# Patient Record
Sex: FEMALE | Race: WHITE | NOT HISPANIC OR LATINO
[De-identification: names, ages, dates, MRNs, and addresses within clinical notes are randomized per-mention and may not be internally consistent; named-entity substitution may affect disease eponyms.]

---

## 2018-02-20 ENCOUNTER — RESULT REVIEW (OUTPATIENT)
Age: 28
End: 2018-02-20

## 2019-01-15 ENCOUNTER — RECORD ABSTRACTING (OUTPATIENT)
Age: 29
End: 2019-01-15

## 2019-01-15 DIAGNOSIS — Z78.9 OTHER SPECIFIED HEALTH STATUS: ICD-10-CM

## 2019-01-15 DIAGNOSIS — Z82.62 FAMILY HISTORY OF OSTEOPOROSIS: ICD-10-CM

## 2019-01-15 DIAGNOSIS — Z82.49 FAMILY HISTORY OF ISCHEMIC HEART DISEASE AND OTHER DISEASES OF THE CIRCULATORY SYSTEM: ICD-10-CM

## 2019-01-15 DIAGNOSIS — Z80.6 FAMILY HISTORY OF LEUKEMIA: ICD-10-CM

## 2019-01-15 PROBLEM — Z00.00 ENCOUNTER FOR PREVENTIVE HEALTH EXAMINATION: Status: ACTIVE | Noted: 2019-01-15

## 2019-01-15 LAB — CYTOLOGY CVX/VAG DOC THIN PREP: NORMAL

## 2019-03-19 ENCOUNTER — APPOINTMENT (OUTPATIENT)
Dept: OBGYN | Facility: CLINIC | Age: 29
End: 2019-03-19
Payer: COMMERCIAL

## 2019-03-19 VITALS
WEIGHT: 128 LBS | SYSTOLIC BLOOD PRESSURE: 110 MMHG | HEIGHT: 65 IN | DIASTOLIC BLOOD PRESSURE: 70 MMHG | BODY MASS INDEX: 21.33 KG/M2

## 2019-03-19 PROCEDURE — 99395 PREV VISIT EST AGE 18-39: CPT

## 2019-03-20 LAB
C TRACH RRNA SPEC QL NAA+PROBE: NOT DETECTED
HPV HIGH+LOW RISK DNA PNL CVX: NOT DETECTED
N GONORRHOEA RRNA SPEC QL NAA+PROBE: NOT DETECTED
SOURCE AMPLIFICATION: NORMAL
SOURCE TP AMPLIFICATION: NORMAL
T VAGINALIS RRNA SPEC QL NAA+PROBE: NOT DETECTED

## 2019-03-20 NOTE — DISCUSSION/SUMMARY
[Condoms] : condom use [Pregnancy Prevention] : pregnancy prevention [FreeTextEntry1] : Options regarding birth control were reviewed.  Pt to RTO annual yearly.  All the pt's questions and concerns were addressed.

## 2019-03-20 NOTE — PHYSICAL EXAM
[Awake] : awake [Alert] : alert [Soft] : soft [Oriented x3] : oriented to person, place, and time [Labia Majora] : labia major [Labia Minora] : labia minora [Normal] : clitoris [Uterine Adnexae] : were not tender and not enlarged [No Bleeding] : there was no active vaginal bleeding [Acute Distress] : no acute distress [Mass] : no breast mass [Nipple Discharge] : no nipple discharge [Axillary LAD] : no axillary lymphadenopathy [Tender] : non tender [Normal Position] : in a normal position [FreeTextEntry6] : normal

## 2019-03-25 LAB — CYTOLOGY CVX/VAG DOC THIN PREP: NORMAL

## 2020-01-14 ENCOUNTER — APPOINTMENT (OUTPATIENT)
Dept: OBGYN | Facility: CLINIC | Age: 30
End: 2020-01-14
Payer: COMMERCIAL

## 2020-01-14 VITALS
DIASTOLIC BLOOD PRESSURE: 74 MMHG | WEIGHT: 126 LBS | BODY MASS INDEX: 20.99 KG/M2 | HEIGHT: 65 IN | SYSTOLIC BLOOD PRESSURE: 100 MMHG

## 2020-01-14 PROCEDURE — 99213 OFFICE O/P EST LOW 20 MIN: CPT

## 2020-01-14 NOTE — HISTORY OF PRESENT ILLNESS
[Last Pap ___] : Last cervical pap smear was [unfilled] [Menarche Age: ____] : age at menarche was [unfilled] [Sexually Active] : is sexually active [Definite:  ___ (Date)] : the last menstrual period was [unfilled] [Contraception] : uses contraception [Monogamous] : is monogamous [Condoms] : uses condoms [No] : no [Male ___] : [unfilled] male

## 2020-01-18 NOTE — DISCUSSION/SUMMARY
[FreeTextEntry1] : Pt will schedule left breast sono to further evaluate. Differential dx reviewed.   All the pt's questions and concerns were addressed.

## 2020-01-18 NOTE — PHYSICAL EXAM
[Awake] : awake [Alert] : alert [Acute Distress] : no acute distress [Examination Of The Breasts] : a normal appearance [Normal] : normal [No Discharge] : no discharge [___cm] : a ~M [unfilled] ~Ucm inferior lateral quadrant mass was palpated [Deep] : deep [Breast Mass Right Breast ___cm] : no was mass palpable [Tender] : tender [___cm Radial Distance from the Nipple] : [unfilled] ~Ucm radially from the nipple [6:00] : in the 6:00 position [Axillary Lymph Nodes Enlarged Bilaterally] : no enlarged nodes

## 2020-02-04 ENCOUNTER — RESULT REVIEW (OUTPATIENT)
Age: 30
End: 2020-02-04

## 2020-08-25 ENCOUNTER — APPOINTMENT (OUTPATIENT)
Dept: OBGYN | Facility: CLINIC | Age: 30
End: 2020-08-25
Payer: COMMERCIAL

## 2020-08-25 VITALS
DIASTOLIC BLOOD PRESSURE: 60 MMHG | SYSTOLIC BLOOD PRESSURE: 100 MMHG | BODY MASS INDEX: 20.66 KG/M2 | WEIGHT: 124 LBS | HEIGHT: 65 IN

## 2020-08-25 DIAGNOSIS — Z86.19 PERSONAL HISTORY OF OTHER INFECTIOUS AND PARASITIC DISEASES: ICD-10-CM

## 2020-08-25 DIAGNOSIS — N64.4 MASTODYNIA: ICD-10-CM

## 2020-08-25 DIAGNOSIS — R92.8 OTHER ABNORMAL AND INCONCLUSIVE FINDINGS ON DIAGNOSTIC IMAGING OF BREAST: ICD-10-CM

## 2020-08-25 PROCEDURE — 99395 PREV VISIT EST AGE 18-39: CPT

## 2020-08-25 RX ORDER — MEDROXYPROGESTERONE ACETATE 10 MG/1
10 TABLET ORAL
Refills: 0 | Status: COMPLETED | COMMUNITY
End: 2020-08-25

## 2020-08-26 PROBLEM — R92.8 ABNORMAL FINDING ON BREAST IMAGING: Status: RESOLVED | Noted: 2020-01-21 | Resolved: 2020-08-26

## 2020-08-26 PROBLEM — N64.4 BREAST TENDERNESS: Status: RESOLVED | Noted: 2020-01-14 | Resolved: 2020-08-26

## 2020-08-26 NOTE — HISTORY OF PRESENT ILLNESS
[1 Year Ago] : 1 year ago [Good] : being in good health [Healthy Diet] : a healthy diet [Regular Exercise] : regular exercise [Last Pap ___] : Last cervical pap smear was [unfilled] [Reproductive Age] : is of reproductive age [Contraception] : uses contraception [Condoms] : uses condoms [Definite:  ___ (Date)] : the last menstrual period was [unfilled] [Menarche Age: ____] : age at menarche was [unfilled] [Sexually Active] : is sexually active [Male ___] : [unfilled] male [No] : no [Menstrual Problems] : reports normal menses [Weight Concerns] : no concerns with her weight [Pregnancy History] : denies prior pregnancies [de-identified] : left breast us- 01/20/20 birads 4- biopsy 02/04/20 fibroadenoma

## 2020-08-26 NOTE — PHYSICAL EXAM
[Alert] : alert [Awake] : awake [Examination Of The Breasts] : a normal appearance [No Discharge] : no discharge [Soft] : soft [No Masses] : no breast masses were palpable [Oriented x3] : oriented to person, place, and time [Labia Minora] : labia minora [Labia Majora] : labia major [No Bleeding] : there was no active vaginal bleeding [Normal] : clitoris [Uterine Adnexae] : were not tender and not enlarged [Pap Obtained] : a Pap smear was performed [Acute Distress] : no acute distress [Mass] : no breast mass [Tender] : no tenderness [Nipple Discharge] : no nipple discharge [Distended] : not distended [Depressed Mood] : not depressed [Flat Affect] : affect not flat

## 2020-08-26 NOTE — END OF VISIT
[FreeTextEntry3] : I, Gabriel Campuzano, acted solely as a scribe for Dr. Vela on this date 08/25/2020.\par All medical record entries made by the Scribe were at my, Dr. Vela's direction and personally dictated by me on  08/25/2020. I have reviewed the chart and agree that the record accurately reflects my personal performance of the history, physical exam, assessment and plan. I have also personally directed, reviewed, and agreed with the chart.\par \par

## 2020-08-27 LAB — HPV HIGH+LOW RISK DNA PNL CVX: NOT DETECTED

## 2020-08-29 LAB — CYTOLOGY CVX/VAG DOC THIN PREP: NORMAL

## 2021-04-12 ENCOUNTER — RESULT CHARGE (OUTPATIENT)
Age: 31
End: 2021-04-12

## 2021-04-12 ENCOUNTER — APPOINTMENT (OUTPATIENT)
Dept: OBGYN | Facility: CLINIC | Age: 31
End: 2021-04-12
Payer: COMMERCIAL

## 2021-04-12 VITALS
BODY MASS INDEX: 21.16 KG/M2 | SYSTOLIC BLOOD PRESSURE: 98 MMHG | DIASTOLIC BLOOD PRESSURE: 60 MMHG | HEIGHT: 65 IN | WEIGHT: 127 LBS | TEMPERATURE: 97.2 F

## 2021-04-12 LAB
HCG UR QL: POSITIVE
QUALITY CONTROL: YES

## 2021-04-12 PROCEDURE — 99213 OFFICE O/P EST LOW 20 MIN: CPT

## 2021-04-12 PROCEDURE — 99072 ADDL SUPL MATRL&STAF TM PHE: CPT

## 2021-04-12 PROCEDURE — 81025 URINE PREGNANCY TEST: CPT

## 2021-04-14 NOTE — HISTORY OF PRESENT ILLNESS
[Patient reported PAP Smear was normal] : Patient reported PAP Smear was normal [Y] : Patient is sexually active [N] : Patient denies prior pregnancies [Menarche Age: ____] : age at menarche was [unfilled] [Currently Active] : currently active [Men] : men [No] : No [TextBox_4] : Pt presents for Confirmation of Pregnancy. [BreastSonogramDate] : 01/20/2020 [TextBox_25] : BR4 [PapSmeardate] : 08/25/2020 [LMPDate] : 02/14/2021 [PGHxTotal] : 0 [FreeTextEntry1] : 02/14/2021

## 2021-04-14 NOTE — DISCUSSION/SUMMARY
[FreeTextEntry1] : ICON positive\par \par Reviewed healthy eating and exercise during pregnancy.\par I advised pt to avoid deli meats, raw seafood and unpasteurized dairy.\par \par Call parameters reviewed\par \par ADvised pt to start PNV with 800 mcg folic acid\par \par Advised pt to follow up with cardiology.\par \par Pt to RTO in 1 week for NEW OB\par \par All of pt's questions and concerns were reviewed.

## 2021-04-19 ENCOUNTER — NON-APPOINTMENT (OUTPATIENT)
Age: 31
End: 2021-04-19

## 2021-04-19 ENCOUNTER — APPOINTMENT (OUTPATIENT)
Dept: OBGYN | Facility: CLINIC | Age: 31
End: 2021-04-19
Payer: COMMERCIAL

## 2021-04-19 ENCOUNTER — ASOB RESULT (OUTPATIENT)
Age: 31
End: 2021-04-19

## 2021-04-19 VITALS
HEIGHT: 65 IN | SYSTOLIC BLOOD PRESSURE: 112 MMHG | DIASTOLIC BLOOD PRESSURE: 64 MMHG | WEIGHT: 126 LBS | BODY MASS INDEX: 20.99 KG/M2

## 2021-04-19 DIAGNOSIS — Z30.09 ENCOUNTER FOR OTHER GENERAL COUNSELING AND ADVICE ON CONTRACEPTION: ICD-10-CM

## 2021-04-19 DIAGNOSIS — Z12.39 ENCOUNTER FOR OTHER SCREENING FOR MALIGNANT NEOPLASM OF BREAST: ICD-10-CM

## 2021-04-19 DIAGNOSIS — Z87.42 PERSONAL HISTORY OF OTHER DISEASES OF THE FEMALE GENITAL TRACT: ICD-10-CM

## 2021-04-19 PROCEDURE — 0500F INITIAL PRENATAL CARE VISIT: CPT

## 2021-04-19 PROCEDURE — 76817 TRANSVAGINAL US OBSTETRIC: CPT

## 2021-04-19 PROCEDURE — 99072 ADDL SUPL MATRL&STAF TM PHE: CPT

## 2021-04-19 PROCEDURE — 36415 COLL VENOUS BLD VENIPUNCTURE: CPT

## 2021-04-21 ENCOUNTER — NON-APPOINTMENT (OUTPATIENT)
Age: 31
End: 2021-04-21

## 2021-04-21 LAB
25(OH)D3 SERPL-MCNC: 46.5 NG/ML
ABO + RH PNL BLD: NORMAL
BASOPHILS # BLD AUTO: 0.03 K/UL
BASOPHILS NFR BLD AUTO: 0.3 %
BILIRUB UR QL STRIP: NORMAL
BLD GP AB SCN SERPL QL: NORMAL
C TRACH RRNA SPEC QL NAA+PROBE: NOT DETECTED
CMV IGG SERPL QL: <0.2 U/ML
CMV IGG SERPL-IMP: NEGATIVE
CMV IGM SERPL QL: <8 AU/ML
CMV IGM SERPL QL: NEGATIVE
COLLECTION METHOD: NORMAL
COVID-19 NUCLEOCAPSID  GAM ANTIBODY INTERPRETATION: NEGATIVE
EOSINOPHIL # BLD AUTO: 0.03 K/UL
EOSINOPHIL NFR BLD AUTO: 0.3 %
GLUCOSE UR-MCNC: NORMAL
HBV SURFACE AG SER QL: NONREACTIVE
HCG UR QL: 0.2 EU/DL
HCT VFR BLD CALC: 45.4 %
HGB A MFR BLD: 96.8 %
HGB A2 MFR BLD: 2.7 %
HGB BLD-MCNC: 13.9 G/DL
HGB F MFR BLD: 0.5 %
HGB FRACT BLD-IMP: NORMAL
HGB UR QL STRIP.AUTO: NORMAL
HIV1+2 AB SPEC QL IA.RAPID: NONREACTIVE
IMM GRANULOCYTES NFR BLD AUTO: 0.2 %
KETONES UR-MCNC: NORMAL
LEUKOCYTE ESTERASE UR QL STRIP: ABNORMAL
LYMPHOCYTES # BLD AUTO: 1.46 K/UL
LYMPHOCYTES NFR BLD AUTO: 17 %
M TB IFN-G BLD-IMP: NEGATIVE
MAN DIFF?: NORMAL
MCHC RBC-ENTMCNC: 30.1 PG
MCHC RBC-ENTMCNC: 30.6 GM/DL
MCV RBC AUTO: 98.3 FL
MEV IGG FLD QL IA: 54.3 AU/ML
MEV IGG+IGM SER-IMP: POSITIVE
MONOCYTES # BLD AUTO: 0.5 K/UL
MONOCYTES NFR BLD AUTO: 5.8 %
N GONORRHOEA RRNA SPEC QL NAA+PROBE: NOT DETECTED
NEUTROPHILS # BLD AUTO: 6.54 K/UL
NEUTROPHILS NFR BLD AUTO: 76.4 %
NITRITE UR QL STRIP: NORMAL
PH UR STRIP: 7
PLATELET # BLD AUTO: 338 K/UL
PROT UR STRIP-MCNC: NORMAL
QUANTIFERON TB PLUS MITOGEN MINUS NIL: 8.52 IU/ML
QUANTIFERON TB PLUS NIL: 0.01 IU/ML
QUANTIFERON TB PLUS TB1 MINUS NIL: 0 IU/ML
QUANTIFERON TB PLUS TB2 MINUS NIL: 0 IU/ML
RBC # BLD: 4.62 M/UL
RBC # FLD: 13.4 %
RUBV IGG FLD-ACNC: 2.1 INDEX
RUBV IGG SER-IMP: POSITIVE
SARS-COV-2 AB SERPL QL IA: 0.07 INDEX
SOURCE AMPLIFICATION: NORMAL
SP GR UR STRIP: 1.02
T GONDII AB SER-IMP: NEGATIVE
T GONDII IGM SER QL: <3 AU/ML
T PALLIDUM AB SER QL IA: NEGATIVE
TSH SERPL-ACNC: 1.37 UIU/ML
WBC # FLD AUTO: 8.58 K/UL

## 2021-04-25 ENCOUNTER — TRANSCRIPTION ENCOUNTER (OUTPATIENT)
Age: 31
End: 2021-04-25

## 2021-05-02 ENCOUNTER — TRANSCRIPTION ENCOUNTER (OUTPATIENT)
Age: 31
End: 2021-05-02

## 2021-05-02 ENCOUNTER — NON-APPOINTMENT (OUTPATIENT)
Age: 31
End: 2021-05-02

## 2021-05-02 LAB
AR GENE MUT ANL BLD/T: NORMAL
B19V IGG SER QL IA: 7.61 INDEX
B19V IGG+IGM SER-IMP: NORMAL
B19V IGG+IGM SER-IMP: POSITIVE
B19V IGM FLD-ACNC: 0.17 INDEX
B19V IGM SER-ACNC: NEGATIVE
CFTR MUT TESTED BLD/T: NEGATIVE
FMR1 GENE MUT ANL BLD/T: NORMAL

## 2021-05-03 ENCOUNTER — NON-APPOINTMENT (OUTPATIENT)
Age: 31
End: 2021-05-03

## 2021-05-04 ENCOUNTER — NON-APPOINTMENT (OUTPATIENT)
Age: 31
End: 2021-05-04

## 2021-05-04 ENCOUNTER — APPOINTMENT (OUTPATIENT)
Dept: OBGYN | Facility: CLINIC | Age: 31
End: 2021-05-04
Payer: COMMERCIAL

## 2021-05-04 VITALS
HEIGHT: 65 IN | DIASTOLIC BLOOD PRESSURE: 60 MMHG | BODY MASS INDEX: 21.33 KG/M2 | WEIGHT: 128 LBS | TEMPERATURE: 97.7 F | SYSTOLIC BLOOD PRESSURE: 108 MMHG

## 2021-05-04 LAB
BILIRUB UR QL STRIP: NORMAL
COLLECTION METHOD: NORMAL
GLUCOSE UR-MCNC: NORMAL
HCG UR QL: 0.2 EU/DL
HGB UR QL STRIP.AUTO: NORMAL
KETONES UR-MCNC: NORMAL
LEUKOCYTE ESTERASE UR QL STRIP: NORMAL
NITRITE UR QL STRIP: NORMAL
PH UR STRIP: 6
PROT UR STRIP-MCNC: NORMAL
SP GR UR STRIP: 1.02

## 2021-05-04 PROCEDURE — 0502F SUBSEQUENT PRENATAL CARE: CPT

## 2021-05-10 ENCOUNTER — ASOB RESULT (OUTPATIENT)
Age: 31
End: 2021-05-10

## 2021-05-10 ENCOUNTER — APPOINTMENT (OUTPATIENT)
Dept: OBGYN | Facility: CLINIC | Age: 31
End: 2021-05-10
Payer: COMMERCIAL

## 2021-05-10 PROCEDURE — 76813 OB US NUCHAL MEAS 1 GEST: CPT

## 2021-05-10 PROCEDURE — 99072 ADDL SUPL MATRL&STAF TM PHE: CPT

## 2021-05-13 ENCOUNTER — NON-APPOINTMENT (OUTPATIENT)
Age: 31
End: 2021-05-13

## 2021-05-17 ENCOUNTER — NON-APPOINTMENT (OUTPATIENT)
Age: 31
End: 2021-05-17

## 2021-05-17 ENCOUNTER — APPOINTMENT (OUTPATIENT)
Dept: OBGYN | Facility: CLINIC | Age: 31
End: 2021-05-17
Payer: COMMERCIAL

## 2021-05-17 ENCOUNTER — ASOB RESULT (OUTPATIENT)
Age: 31
End: 2021-05-17

## 2021-05-17 VITALS
BODY MASS INDEX: 21.66 KG/M2 | WEIGHT: 130 LBS | SYSTOLIC BLOOD PRESSURE: 100 MMHG | DIASTOLIC BLOOD PRESSURE: 60 MMHG | HEIGHT: 65 IN

## 2021-05-17 LAB
BILIRUB UR QL STRIP: NORMAL
GLUCOSE UR-MCNC: NORMAL
HCG UR QL: 0.2 EU/DL
HGB UR QL STRIP.AUTO: NORMAL
KETONES UR-MCNC: NORMAL
LEUKOCYTE ESTERASE UR QL STRIP: NORMAL
NITRITE UR QL STRIP: NORMAL
PH UR STRIP: 7
PROT UR STRIP-MCNC: NORMAL
SP GR UR STRIP: 1.02

## 2021-05-17 PROCEDURE — 99213 OFFICE O/P EST LOW 20 MIN: CPT

## 2021-05-17 PROCEDURE — 76817 TRANSVAGINAL US OBSTETRIC: CPT | Mod: 59

## 2021-05-17 PROCEDURE — 76815 OB US LIMITED FETUS(S): CPT

## 2021-05-17 PROCEDURE — 99072 ADDL SUPL MATRL&STAF TM PHE: CPT

## 2021-05-17 PROCEDURE — 81003 URINALYSIS AUTO W/O SCOPE: CPT | Mod: QW

## 2021-05-19 ENCOUNTER — NON-APPOINTMENT (OUTPATIENT)
Age: 31
End: 2021-05-19

## 2021-05-20 ENCOUNTER — NON-APPOINTMENT (OUTPATIENT)
Age: 31
End: 2021-05-20

## 2021-05-20 ENCOUNTER — APPOINTMENT (OUTPATIENT)
Dept: OBGYN | Facility: CLINIC | Age: 31
End: 2021-05-20
Payer: COMMERCIAL

## 2021-05-20 VITALS
WEIGHT: 129 LBS | TEMPERATURE: 97.8 F | DIASTOLIC BLOOD PRESSURE: 62 MMHG | HEIGHT: 65 IN | BODY MASS INDEX: 21.49 KG/M2 | SYSTOLIC BLOOD PRESSURE: 102 MMHG

## 2021-05-20 PROCEDURE — 0502F SUBSEQUENT PRENATAL CARE: CPT

## 2021-05-21 ENCOUNTER — NON-APPOINTMENT (OUTPATIENT)
Age: 31
End: 2021-05-21

## 2021-05-21 LAB
BILIRUB UR QL STRIP: NORMAL
COLLECTION METHOD: NORMAL
GLUCOSE UR-MCNC: NORMAL
HCG UR QL: 0.2 EU/DL
HGB UR QL STRIP.AUTO: ABNORMAL
KETONES UR-MCNC: NORMAL
LEUKOCYTE ESTERASE UR QL STRIP: NORMAL
NITRITE UR QL STRIP: NORMAL
PH UR STRIP: 6
PROT UR STRIP-MCNC: NORMAL
SP GR UR STRIP: 1.02

## 2021-05-23 LAB
BACTERIA UR CULT: ABNORMAL
CANDIDA VAG CYTO: NOT DETECTED
G VAGINALIS+PREV SP MTYP VAG QL MICRO: NOT DETECTED
T VAGINALIS VAG QL WET PREP: NOT DETECTED

## 2021-05-25 ENCOUNTER — NON-APPOINTMENT (OUTPATIENT)
Age: 31
End: 2021-05-25

## 2021-05-26 ENCOUNTER — NON-APPOINTMENT (OUTPATIENT)
Age: 31
End: 2021-05-26

## 2021-05-26 ENCOUNTER — APPOINTMENT (OUTPATIENT)
Dept: OBGYN | Facility: CLINIC | Age: 31
End: 2021-05-26
Payer: COMMERCIAL

## 2021-05-26 VITALS
SYSTOLIC BLOOD PRESSURE: 116 MMHG | DIASTOLIC BLOOD PRESSURE: 58 MMHG | BODY MASS INDEX: 21.66 KG/M2 | HEIGHT: 65 IN | WEIGHT: 130 LBS

## 2021-05-26 LAB
BILIRUB UR QL STRIP: NORMAL
COLLECTION METHOD: NORMAL
GLUCOSE UR-MCNC: NORMAL
HCG UR QL: 0.2 EU/DL
HGB UR QL STRIP.AUTO: ABNORMAL
KETONES UR-MCNC: NORMAL
LEUKOCYTE ESTERASE UR QL STRIP: NORMAL
NITRITE UR QL STRIP: NORMAL
PH UR STRIP: 5.5
PROT UR STRIP-MCNC: NORMAL
SP GR UR STRIP: 1.02

## 2021-05-26 PROCEDURE — 0502F SUBSEQUENT PRENATAL CARE: CPT

## 2021-05-30 ENCOUNTER — NON-APPOINTMENT (OUTPATIENT)
Age: 31
End: 2021-05-30

## 2021-05-30 LAB
1ST TRIMESTER DATA: NORMAL
ADDENDUM DOC: NORMAL
AFP PNL SERPL: NORMAL
AFP SERPL-ACNC: NORMAL
CLARIM 15Q11.2: NORMAL
CLARIM 1P36: NORMAL
CLARIM 22Q11.2: NORMAL
CLARIM 4P-/WOLF-HIRSCHHORN: NORMAL
CLARIM 5P-/CRI DU CHAT: NORMAL
CLARIM ADDITIONAL INFO: NORMAL
CLARIM CHROMOSOME 13: NORMAL
CLARIM CHROMOSOME 18: NORMAL
CLARIM CHROMOSOME 21: NORMAL
CLARIM SEX CHROMOSOMES: NORMAL
CLARITEST NIPT W/MICRO: NORMAL
CLINICAL BIOCHEMIST REVIEW: NORMAL
FREE BETA HCG 1ST TRIMESTER: NORMAL
Lab: NORMAL
NASAL BONE: PRESENT
NOTES NTD: NORMAL
NT: NORMAL
PAPP-A SERPL-ACNC: NORMAL
TRISOMY 18/3: NORMAL

## 2021-06-02 ENCOUNTER — NON-APPOINTMENT (OUTPATIENT)
Age: 31
End: 2021-06-02

## 2021-06-02 ENCOUNTER — APPOINTMENT (OUTPATIENT)
Dept: OBGYN | Facility: CLINIC | Age: 31
End: 2021-06-02
Payer: COMMERCIAL

## 2021-06-02 VITALS
HEIGHT: 65 IN | WEIGHT: 132 LBS | DIASTOLIC BLOOD PRESSURE: 60 MMHG | BODY MASS INDEX: 21.99 KG/M2 | SYSTOLIC BLOOD PRESSURE: 112 MMHG

## 2021-06-02 PROCEDURE — 0502F SUBSEQUENT PRENATAL CARE: CPT

## 2021-06-03 LAB
BILIRUB UR QL STRIP: NORMAL
COLLECTION METHOD: NORMAL
GLUCOSE UR-MCNC: NORMAL
HCG UR QL: 0.2 EU/DL
HGB UR QL STRIP.AUTO: NORMAL
KETONES UR-MCNC: NORMAL
LEUKOCYTE ESTERASE UR QL STRIP: NORMAL
NITRITE UR QL STRIP: NORMAL
PH UR STRIP: 7
PROT UR STRIP-MCNC: NORMAL
SP GR UR STRIP: 1.02

## 2021-06-04 ENCOUNTER — NON-APPOINTMENT (OUTPATIENT)
Age: 31
End: 2021-06-04

## 2021-06-08 LAB
1ST TRIMESTER DATA: NORMAL
2ND TRIMESTER DATA: NORMAL
AFP PNL SERPL: NORMAL
AFP SERPL-ACNC: NORMAL
AFP SERPL-ACNC: NORMAL
B-HCG FREE SERPL-MCNC: NORMAL
CLINICAL BIOCHEMIST REVIEW: NORMAL
FREE BETA HCG 1ST TRIMESTER: NORMAL
INHIBIN A SERPL-MCNC: NORMAL
NASAL BONE: PRESENT
NOTES NTD: NORMAL
NT: NORMAL
PAPP-A SERPL-ACNC: NORMAL
U ESTRIOL SERPL-SCNC: NORMAL

## 2021-07-06 ENCOUNTER — NON-APPOINTMENT (OUTPATIENT)
Age: 31
End: 2021-07-06

## 2021-07-07 ENCOUNTER — APPOINTMENT (OUTPATIENT)
Dept: ANTEPARTUM | Facility: CLINIC | Age: 31
End: 2021-07-07
Payer: COMMERCIAL

## 2021-07-07 ENCOUNTER — ASOB RESULT (OUTPATIENT)
Age: 31
End: 2021-07-07

## 2021-07-07 ENCOUNTER — APPOINTMENT (OUTPATIENT)
Dept: MATERNAL FETAL MEDICINE | Facility: CLINIC | Age: 31
End: 2021-07-07
Payer: COMMERCIAL

## 2021-07-07 ENCOUNTER — NON-APPOINTMENT (OUTPATIENT)
Age: 31
End: 2021-07-07

## 2021-07-07 VITALS
BODY MASS INDEX: 22.66 KG/M2 | SYSTOLIC BLOOD PRESSURE: 96 MMHG | WEIGHT: 136 LBS | RESPIRATION RATE: 16 BRPM | HEART RATE: 72 BPM | OXYGEN SATURATION: 98 % | DIASTOLIC BLOOD PRESSURE: 54 MMHG | HEIGHT: 65 IN

## 2021-07-07 DIAGNOSIS — Z78.9 OTHER SPECIFIED HEALTH STATUS: ICD-10-CM

## 2021-07-07 DIAGNOSIS — Z11.3 ENCOUNTER FOR SCREENING FOR INFECTIONS WITH A PREDOMINANTLY SEXUAL MODE OF TRANSMISSION: ICD-10-CM

## 2021-07-07 DIAGNOSIS — Z98.890 OTHER SPECIFIED POSTPROCEDURAL STATES: ICD-10-CM

## 2021-07-07 PROCEDURE — 99072 ADDL SUPL MATRL&STAF TM PHE: CPT

## 2021-07-07 PROCEDURE — 76817 TRANSVAGINAL US OBSTETRIC: CPT

## 2021-07-07 PROCEDURE — 99244 OFF/OP CNSLTJ NEW/EST MOD 40: CPT

## 2021-07-07 PROCEDURE — 76811 OB US DETAILED SNGL FETUS: CPT

## 2021-07-07 RX ORDER — SODIUM SULFACETAMIDE 100 MG/ML
10 LOTION TOPICAL
Qty: 118 | Refills: 0 | Status: DISCONTINUED | COMMUNITY
Start: 2021-01-29 | End: 2021-07-07

## 2021-07-07 NOTE — SURGICAL HISTORY
[Last Pap: ___] : Last Pap: [unfilled] [Last Mammo: ___] : Last Mammo: [unfilled] [Fibroids] : no fibroids [Abn Paps] : no abnormal pap smears [Breast Disease] : no breast disease [STI's] : no STI's [Infertility] : no infertility [Cysts] : no cysts [OC Use] : no OC use

## 2021-07-07 NOTE — DISCUSSION/SUMMARY
[FreeTextEntry1] : She is 20 weeks and 3 days gestation by her last menstrual period dates. \par \par She was born with a ventricular septal defect. She told me that she was last seen by a cardiologist, who was affiliated with [facility], approximately 3 years ago. She has been told that the ventricular septal defect is very small and she states that she is asymptomatic.  I told her that her that her offspring are at increased risk for having congenital heart defects because she was born with the ventricular septal defect.  The relative risk for the fetus having a congenital heart defect is 3.5.  I gave her a referral to have a fetal echocardiogram done at approximately 22 weeks of gestation.\par \par I discussed today's ultrasound finding of fetal echogenic intracardiac focus. I told her that an echogenic intracardiac focus is not considered a structural or functional cardiac abnormality. It has not been associated with congenital heart malformations in newborns. She was told that a fetal echocardiogram is not recommended. I made her aware of the association between a fetal echogenic intracardiac focus and fetal aneuploidy such as Trisomy 21.  I informed her that fetal echogenic intracardiac foci are a common finding during the second trimester of pregnancy and are usually seen in 3 to 5% of normal fetuses.  The prevalence of the echogenic intracardiac focus varies according to maternal ethnicity, being higher in fetuses of women of [place], -American and Middle-Eastern descent. It is believed that the echogenic focus represents mineralization of the papillary muscle.  She was informed that ultrasound markers suggestive of fetal aneuploidy like the echogenic intracardiac foci are non-specific, most frequently seen in normal fetuses, and often transient. A meta-analysis reported a 2.9 likelihood ratio for Trisomy 21 when an isolated echogenic intracardiac focus was defined as no major fetal abnormalities, no ventriculomegaly, no increased nuchal skin fold thickness, no echogenic bowel, no pyelectasis, and no short femur or humerus.  I told her that there were no major fetal structural malformations or other ultrasound markers suggestive of fetal aneuploidy seen during today's ultrasound examination. She was told that all pregnancies have a 2 to 3% risk of having a baby born with a birth defect. I discussed the various screening and diagnostic tests used for prenatal diagnosis.  I explained the difference between screening and diagnostic tests. I reviewed her available prenatal records for a First Trimester Screen test, Sequential Screen test, and a noninvasive prenatal screen test.  All screening tests results reported a low risk for fetal chromosomal malformations. She was made aware that prenatal diagnosis is available to determine whether the fetus she is carrying has normal or abnormal chromosomes.  She was offered diagnostic testing with a genetic amniocentesis.  She was made aware of the small risk of miscarriage associated with the diagnostic procedure, the limitations of the procedure, and the alternative of not having the procedure. She decided to not have the genetic amniocentesis.  I told her that an echogenic intracardiac focus is considered a normal variant whenever there is a negative aneuploidy screen for Down syndrome, and no visualized fetal structural malformations or other aneuploidy markers. She expressed understanding and all her questions were answered.\par \par I recommend a glucola challenge test to screen for gestational diabetes between 24 and 28 weeks gestation. I also recommend the Tdap vaccine between 27 and 36 weeks of gestation to prevent pertussis infection in the  infant. I recommend the flu vaccine during flu season (October through May). She should have serial ultrasounds to evaluate fetal growth and development.

## 2021-07-07 NOTE — OB HISTORY
[LMP: ___] : LMP: [unfilled] [MONTANA: ___] : MONTANA: [unfilled] [EGA: ___ wks] : EGA: [unfilled] wks [Spontaneous] : Spontaneous conception [Sonogram] : sonogram [at ___ wks] : at [unfilled] weeks [Definite:  ___ (Date)] : the last menstrual period was [unfilled] [Normal Amount/Duration] : was of a normal amount and duration [Regular Cycle Intervals] : periods have been regular [Frequency: Q ___ days] : menstrual periods occur approximately every [unfilled] days [Menarche Age: ____] : age at menarche was [unfilled] [FreeTextEntry1] : \par She had a first trimester screen test done on May 10, 2021 that reported a low risk for Down syndrome and trisomy 18/13.  She had a sequential screen test done on June 2, 2021 that reported a low risk for fetal chromosomal malformations.  She also had a noninvasive prenatal screen test on June 2, 2021 that reported a low risk for fetal chromosomal malformations. [Spotting Between  Menses] : no spotting between menses [Menstrual Cramps] : no menstrual cramps [On BCP at conception] : the patient was not on BCP at conception

## 2021-07-07 NOTE — FAMILY HISTORY
[Age 35+ During Pregnancy] : not 35 or over during pregnancy [Reported Family History Of Birth Defects] : no congenital heart defects [Brett-Sachs Carrier] : no Brett-Sachs [Family History] : no mental retardation/autism [Reported Family History Of Genetic Disease] : no history of child defect in child of baby father

## 2021-07-07 NOTE — CHIEF COMPLAINT
[G ___] : G [unfilled] [P ___] : P [unfilled] [de-identified] : because she was born with a ventricular septal defect

## 2021-07-07 NOTE — VITALS
[LMP (date): ___] : LMP was on [unfilled] [GA =___ Weeks] : which calculates to a GA of [unfilled] weeks [GA= ___ Days] : and [unfilled] day(s) [MONTANA by LMP (date): ___] : The calculated MONTANA by LMP is [unfilled] [By LMP] : this is the final MONTANA

## 2021-07-13 ENCOUNTER — NON-APPOINTMENT (OUTPATIENT)
Age: 31
End: 2021-07-13

## 2021-07-15 ENCOUNTER — NON-APPOINTMENT (OUTPATIENT)
Age: 31
End: 2021-07-15

## 2021-07-15 ENCOUNTER — APPOINTMENT (OUTPATIENT)
Dept: OBGYN | Facility: CLINIC | Age: 31
End: 2021-07-15
Payer: COMMERCIAL

## 2021-07-15 VITALS
WEIGHT: 138 LBS | HEIGHT: 65 IN | BODY MASS INDEX: 22.99 KG/M2 | DIASTOLIC BLOOD PRESSURE: 70 MMHG | SYSTOLIC BLOOD PRESSURE: 112 MMHG | TEMPERATURE: 97.9 F

## 2021-07-15 LAB
BILIRUB UR QL STRIP: NEGATIVE
GLUCOSE UR-MCNC: NEGATIVE
HCG UR QL: 0.2 EU/DL
HGB UR QL STRIP.AUTO: ABNORMAL
KETONES UR-MCNC: NEGATIVE
LEUKOCYTE ESTERASE UR QL STRIP: NEGATIVE
NITRITE UR QL STRIP: NEGATIVE
PH UR STRIP: 7.5
PROT UR STRIP-MCNC: NEGATIVE
SP GR UR STRIP: 1.02

## 2021-07-15 PROCEDURE — 0502F SUBSEQUENT PRENATAL CARE: CPT

## 2021-07-22 ENCOUNTER — APPOINTMENT (OUTPATIENT)
Dept: PEDIATRIC CARDIOLOGY | Facility: CLINIC | Age: 31
End: 2021-07-22
Payer: COMMERCIAL

## 2021-07-22 DIAGNOSIS — Q21.0 VENTRICULAR SEPTAL DEFECT: ICD-10-CM

## 2021-07-22 PROCEDURE — 76827 ECHO EXAM OF FETAL HEART: CPT

## 2021-07-22 PROCEDURE — 99204 OFFICE O/P NEW MOD 45 MIN: CPT | Mod: 25

## 2021-07-22 PROCEDURE — 76821 MIDDLE CEREBRAL ARTERY ECHO: CPT

## 2021-07-22 PROCEDURE — 93325 DOPPLER ECHO COLOR FLOW MAPG: CPT | Mod: 59

## 2021-07-22 PROCEDURE — 76825 ECHO EXAM OF FETAL HEART: CPT

## 2021-07-22 PROCEDURE — 76820 UMBILICAL ARTERY ECHO: CPT

## 2021-07-22 PROCEDURE — 99072 ADDL SUPL MATRL&STAF TM PHE: CPT

## 2021-08-09 ENCOUNTER — NON-APPOINTMENT (OUTPATIENT)
Age: 31
End: 2021-08-09

## 2021-08-16 ENCOUNTER — APPOINTMENT (OUTPATIENT)
Dept: OBGYN | Facility: CLINIC | Age: 31
End: 2021-08-16
Payer: COMMERCIAL

## 2021-08-16 ENCOUNTER — LABORATORY RESULT (OUTPATIENT)
Age: 31
End: 2021-08-16

## 2021-08-16 VITALS
HEIGHT: 65 IN | SYSTOLIC BLOOD PRESSURE: 118 MMHG | WEIGHT: 142 LBS | BODY MASS INDEX: 23.66 KG/M2 | DIASTOLIC BLOOD PRESSURE: 78 MMHG

## 2021-08-16 PROCEDURE — 0502F SUBSEQUENT PRENATAL CARE: CPT

## 2021-08-16 PROCEDURE — 36415 COLL VENOUS BLD VENIPUNCTURE: CPT

## 2021-08-24 ENCOUNTER — NON-APPOINTMENT (OUTPATIENT)
Age: 31
End: 2021-08-24

## 2021-08-24 LAB — GLUCOSE 1H P 50 G GLC PO SERPL-MCNC: 157 MG/DL

## 2021-08-26 ENCOUNTER — NON-APPOINTMENT (OUTPATIENT)
Age: 31
End: 2021-08-26

## 2021-08-28 LAB
GLUCOSE 1H P 100 G GLC PO SERPL-MCNC: 138 MG/DL
GLUCOSE 2H P CHAL SERPL-MCNC: 121 MG/DL
GLUCOSE 3H P CHAL SERPL-MCNC: 108 MG/DL
GLUCOSE BS SERPL-MCNC: 71 MG/DL

## 2021-09-01 ENCOUNTER — NON-APPOINTMENT (OUTPATIENT)
Age: 31
End: 2021-09-01

## 2021-09-10 ENCOUNTER — NON-APPOINTMENT (OUTPATIENT)
Age: 31
End: 2021-09-10

## 2021-09-10 ENCOUNTER — APPOINTMENT (OUTPATIENT)
Dept: CARDIOLOGY | Facility: CLINIC | Age: 31
End: 2021-09-10
Payer: COMMERCIAL

## 2021-09-10 VITALS
SYSTOLIC BLOOD PRESSURE: 92 MMHG | BODY MASS INDEX: 24.16 KG/M2 | HEIGHT: 65 IN | OXYGEN SATURATION: 96 % | WEIGHT: 145 LBS | HEART RATE: 97 BPM | DIASTOLIC BLOOD PRESSURE: 60 MMHG

## 2021-09-10 PROCEDURE — 93000 ELECTROCARDIOGRAM COMPLETE: CPT

## 2021-09-10 PROCEDURE — 99215 OFFICE O/P EST HI 40 MIN: CPT

## 2021-09-10 PROCEDURE — 99205 OFFICE O/P NEW HI 60 MIN: CPT

## 2021-09-10 NOTE — FAMILY HISTORY
[FreeTextEntry1] : FamilyHistory_20_twCiteListControlStart FamilyHistory_20_twCiteListControlEnd [id number] \par A four-generation family history was constructed and scanned into [employer]. \par Family history is significant for: \par siblings\par 30 yo twin sister fraternal nml ECHO- healthy - 7W old daughter\par \par Mother 62 yo MVP otherwise healthy \par Maternal aunts  none\par Maternal uncles dec 10 yo car accident\par Maternal Grandmother 84 yo lymphoma in remission \par Maternal Grandfather 88 yo lymphoma in remission, HLD\par \par Father 62 yo hx MVP, HCM\par Paternal aunts x2\par 57 yo aunt healthy- 2 sons 23 yo and 20 yo healthy\par 61 yo aunt healthy- son 30 yo, daughter 34 yo (daughter 1 yo and pregnant w/ F) healthy\par Paternal uncles none\par Paternal Grandfather dec 55 yo lung CA \par Paternal Grandmother dec 78 yo dementia\par \par children:\par pregnant 30 weeks echogenic focus on papillary muscle, fetal echo\par \par her maternal families originate from [place] and [place] and paternal families originate from [place]/ [place]\par + Mandaen unk if Ashkenazi Baptist ancestry. \par Family history was negative for consanguinity  \par No family history of SIDS\par  \par \par  [FreeTextEntry2] : [place] and [place]ian [FreeTextEntry3] : Vanes[place]/ [place]

## 2021-09-10 NOTE — PHYSICAL EXAM
[Normal] : no rash, no stigmata of neurocutaneous disease [Muscle tone/ strength] : muscle tone/ strength is normal [Tremor] : no tremor

## 2021-09-10 NOTE — REASON FOR VISIT
[FreeTextEntry3] : MEGAN DAVIS  is being seen  for an initial consultation at the Cardiogenomics Program at [facility] on 09/10/2021.   Ms. DAVIS was referred by Dr Yepez for hereditary cardiac predisposition risk assessment and counseling, due to her Fh of HCM and hx of VSD\par \par

## 2021-09-10 NOTE — HISTORY OF PRESENT ILLNESS
[FreeTextEntry1] : MEGAN DAVIS is a 29 yo F PMH VSD, currently pregnant  LMP 2/14/21 30 weeks \par \par used to be followed annually by a pediatric cardiologist growing up , was a competative swimmer\par denies hx of CM \par \par Her family history detailed below is significant for CM in her father\par prior fluttering in heart no arrhythmia on workup due to caffeine\par now resolved\par \par today she is referred for a cardiogenomic evaluation

## 2021-09-14 ENCOUNTER — APPOINTMENT (OUTPATIENT)
Dept: OBGYN | Facility: CLINIC | Age: 31
End: 2021-09-14
Payer: COMMERCIAL

## 2021-09-14 VITALS
BODY MASS INDEX: 23.99 KG/M2 | WEIGHT: 144 LBS | HEIGHT: 65 IN | SYSTOLIC BLOOD PRESSURE: 116 MMHG | DIASTOLIC BLOOD PRESSURE: 62 MMHG

## 2021-09-14 DIAGNOSIS — Z34.92 ENCOUNTER FOR SUPERVISION OF NORMAL PREGNANCY, UNSPECIFIED, SECOND TRIMESTER: ICD-10-CM

## 2021-09-14 PROCEDURE — 0502F SUBSEQUENT PRENATAL CARE: CPT

## 2021-09-15 LAB
BILIRUB UR QL STRIP: NORMAL
CLARITY UR: CLEAR
COLLECTION METHOD: NORMAL
GLUCOSE UR-MCNC: NORMAL
HCG UR QL: 0.2 EU/DL
HGB UR QL STRIP.AUTO: NORMAL
KETONES UR-MCNC: NORMAL
LEUKOCYTE ESTERASE UR QL STRIP: NORMAL
NITRITE UR QL STRIP: NORMAL
PH UR STRIP: 6
PROT UR STRIP-MCNC: NORMAL
SP GR UR STRIP: 1.01

## 2021-09-17 PROBLEM — Z34.92 SECOND TRIMESTER PREGNANCY: Status: RESOLVED | Noted: 2021-05-26 | Resolved: 2021-09-17

## 2021-09-29 ENCOUNTER — ASOB RESULT (OUTPATIENT)
Age: 31
End: 2021-09-29

## 2021-09-29 ENCOUNTER — APPOINTMENT (OUTPATIENT)
Dept: ANTEPARTUM | Facility: CLINIC | Age: 31
End: 2021-09-29
Payer: COMMERCIAL

## 2021-09-29 PROCEDURE — 76816 OB US FOLLOW-UP PER FETUS: CPT

## 2021-09-29 PROCEDURE — 76819 FETAL BIOPHYS PROFIL W/O NST: CPT

## 2021-09-30 ENCOUNTER — NON-APPOINTMENT (OUTPATIENT)
Age: 31
End: 2021-09-30

## 2021-09-30 ENCOUNTER — MED ADMIN CHARGE (OUTPATIENT)
Age: 31
End: 2021-09-30

## 2021-09-30 ENCOUNTER — APPOINTMENT (OUTPATIENT)
Dept: OBGYN | Facility: CLINIC | Age: 31
End: 2021-09-30
Payer: COMMERCIAL

## 2021-09-30 VITALS
SYSTOLIC BLOOD PRESSURE: 102 MMHG | WEIGHT: 147 LBS | DIASTOLIC BLOOD PRESSURE: 60 MMHG | HEIGHT: 65 IN | BODY MASS INDEX: 24.49 KG/M2

## 2021-09-30 LAB
BILIRUB UR QL STRIP: NORMAL
GLUCOSE UR-MCNC: NORMAL
HCG UR QL: 0.2 EU/DL
HGB UR QL STRIP.AUTO: NORMAL
KETONES UR-MCNC: NORMAL
LEUKOCYTE ESTERASE UR QL STRIP: ABNORMAL
NITRITE UR QL STRIP: NORMAL
PH UR STRIP: 7
PROT UR STRIP-MCNC: NORMAL
SP GR UR STRIP: 1.01

## 2021-09-30 PROCEDURE — 0502F SUBSEQUENT PRENATAL CARE: CPT

## 2021-09-30 PROCEDURE — 90471 IMMUNIZATION ADMIN: CPT

## 2021-09-30 PROCEDURE — 90715 TDAP VACCINE 7 YRS/> IM: CPT

## 2021-10-12 ENCOUNTER — APPOINTMENT (OUTPATIENT)
Dept: OBGYN | Facility: CLINIC | Age: 31
End: 2021-10-12
Payer: COMMERCIAL

## 2021-10-12 VITALS
SYSTOLIC BLOOD PRESSURE: 108 MMHG | DIASTOLIC BLOOD PRESSURE: 60 MMHG | WEIGHT: 149 LBS | BODY MASS INDEX: 24.83 KG/M2 | HEIGHT: 65 IN

## 2021-10-12 LAB
BILIRUB UR QL STRIP: NORMAL
GLUCOSE UR-MCNC: NORMAL
HCG UR QL: 0.2 EU/DL
HGB UR QL STRIP.AUTO: NORMAL
KETONES UR-MCNC: NORMAL
LEUKOCYTE ESTERASE UR QL STRIP: NORMAL
NITRITE UR QL STRIP: NORMAL
PH UR STRIP: 7
PROT UR STRIP-MCNC: NORMAL
SP GR UR STRIP: 1.01

## 2021-10-12 PROCEDURE — 0502F SUBSEQUENT PRENATAL CARE: CPT

## 2021-10-20 LAB — COMBINED CARDIAC PANEL: POSITIVE

## 2021-10-27 ENCOUNTER — APPOINTMENT (OUTPATIENT)
Dept: ANTEPARTUM | Facility: CLINIC | Age: 31
End: 2021-10-27
Payer: COMMERCIAL

## 2021-10-27 ENCOUNTER — ASOB RESULT (OUTPATIENT)
Age: 31
End: 2021-10-27

## 2021-10-27 PROCEDURE — 76816 OB US FOLLOW-UP PER FETUS: CPT

## 2021-10-27 PROCEDURE — 76819 FETAL BIOPHYS PROFIL W/O NST: CPT

## 2021-10-28 ENCOUNTER — APPOINTMENT (OUTPATIENT)
Dept: OBGYN | Facility: CLINIC | Age: 31
End: 2021-10-28
Payer: COMMERCIAL

## 2021-10-28 VITALS
DIASTOLIC BLOOD PRESSURE: 60 MMHG | WEIGHT: 150 LBS | BODY MASS INDEX: 24.99 KG/M2 | HEIGHT: 65 IN | SYSTOLIC BLOOD PRESSURE: 108 MMHG

## 2021-10-28 DIAGNOSIS — R82.71 BACTERIURIA: ICD-10-CM

## 2021-10-28 DIAGNOSIS — Z3A.20 20 WEEKS GESTATION OF PREGNANCY: ICD-10-CM

## 2021-10-28 DIAGNOSIS — Z3A.28 28 WEEKS GESTATION OF PREGNANCY: ICD-10-CM

## 2021-10-28 PROCEDURE — 36415 COLL VENOUS BLD VENIPUNCTURE: CPT

## 2021-10-28 PROCEDURE — 0502F SUBSEQUENT PRENATAL CARE: CPT

## 2021-10-30 LAB
BILIRUB UR QL STRIP: NORMAL
COLLECTION METHOD: NORMAL
GLUCOSE UR-MCNC: NORMAL
HCG UR QL: 0.2 EU/DL
HGB UR QL STRIP.AUTO: NORMAL
HIV1+2 AB SPEC QL IA.RAPID: NONREACTIVE
KETONES UR-MCNC: NORMAL
LEUKOCYTE ESTERASE UR QL STRIP: NORMAL
NITRITE UR QL STRIP: NORMAL
PH UR STRIP: 6.5
PROT UR STRIP-MCNC: NORMAL
SP GR UR STRIP: 1.02
T PALLIDUM AB SER QL IA: NEGATIVE

## 2021-11-02 ENCOUNTER — APPOINTMENT (OUTPATIENT)
Dept: OBGYN | Facility: CLINIC | Age: 31
End: 2021-11-02
Payer: COMMERCIAL

## 2021-11-02 VITALS
HEIGHT: 62 IN | WEIGHT: 152 LBS | BODY MASS INDEX: 27.97 KG/M2 | SYSTOLIC BLOOD PRESSURE: 108 MMHG | DIASTOLIC BLOOD PRESSURE: 60 MMHG

## 2021-11-02 PROCEDURE — 0502F SUBSEQUENT PRENATAL CARE: CPT

## 2021-11-05 ENCOUNTER — NON-APPOINTMENT (OUTPATIENT)
Age: 31
End: 2021-11-05

## 2021-11-05 ENCOUNTER — APPOINTMENT (OUTPATIENT)
Dept: CARDIOLOGY | Facility: CLINIC | Age: 31
End: 2021-11-05
Payer: COMMERCIAL

## 2021-11-05 PROCEDURE — 99243 OFF/OP CNSLTJ NEW/EST LOW 30: CPT

## 2021-11-05 NOTE — FAMILY HISTORY
[FreeTextEntry1] : FamilyHistory_20_twCiteListControlStart FamilyHistory_20_twCiteListControlEnd [id number] \par A four-generation family history was constructed and scanned into [employer]. \par Family history is significant for: \par siblings\par 28 yo twin sister fraternal nml ECHO- healthy - 7W old daughter\par \par Mother 64 yo MVP otherwise healthy \par Maternal aunts  none\par Maternal uncles dec 10 yo car accident\par Maternal Grandmother 84 yo lymphoma in remission \par Maternal Grandfather 90 yo lymphoma in remission, HLD\par \par Father 64 yo hx MVP, HCM\par Paternal aunts x2\par 57 yo aunt healthy- 2 sons 21 yo and 20 yo healthy\par 59 yo aunt healthy- son 28 yo, daughter 32 yo (daughter 1 yo and pregnant w/ F) healthy\par Paternal uncles none\par Paternal Grandfather dec 53 yo lung CA \par Paternal Grandmother dec 80 yo dementia\par \par children:\par pregnant 30 weeks echogenic focus on papillary muscle, fetal echo\par \par her maternal families originate from [place] and [place] and paternal families originate from [place]/ [place]\par + Yazdanism unk if Ashkenazi Hoahaoism ancestry. \par Family history was negative for consanguinity  \par No family history of SIDS\par  \par \par  [FreeTextEntry2] : [place] and Korean [FreeTextEntry3] : Vanesa/ Cook Islander

## 2021-11-05 NOTE — HISTORY OF PRESENT ILLNESS
[Home] : at home, [unfilled] , at the time of the visit. [Medical Office: ([facility])___] : at the medical office located in  [Verbal consent obtained from patient] : the patient, [unfilled] [FreeTextEntry1] : MEGAN DAVIS is a 31 yo F PMH VSD, currently pregnant  LMP 2/14/21 30 weeks \par \par used to be followed annually by a pediatric cardiologist growing up , was a competitive swimmer\par denies hx of CM \par \par Her family history detailed below is significant for CM in her father\par prior fluttering in heart no arrhythmia on workup due to caffeine\par now resolved\par \par she underwent genetic testing and today presents for results

## 2021-11-05 NOTE — REASON FOR VISIT
[FreeTextEntry3] : MEGAN DAVIS  is being seen  for an initial consultation at the Cardiogenomics Program at [facility] on 09/10/2021.   Ms. DAVIS was referred by Dr Yepez for hereditary cardiac predisposition risk assessment and counseling, due to her Fh of HCM and hx of VSD\par \par

## 2021-11-08 LAB
BILIRUB UR QL STRIP: NORMAL
GLUCOSE UR-MCNC: NORMAL
HCG UR QL: 0.2 EU/DL
HGB UR QL STRIP.AUTO: NORMAL
KETONES UR-MCNC: NORMAL
LEUKOCYTE ESTERASE UR QL STRIP: ABNORMAL
NITRITE UR QL STRIP: NORMAL
PH UR STRIP: 5.5
PROT UR STRIP-MCNC: NORMAL
SP GR UR STRIP: 1.01

## 2021-11-09 ENCOUNTER — APPOINTMENT (OUTPATIENT)
Dept: OBGYN | Facility: CLINIC | Age: 31
End: 2021-11-09
Payer: COMMERCIAL

## 2021-11-09 VITALS
HEIGHT: 62 IN | DIASTOLIC BLOOD PRESSURE: 60 MMHG | BODY MASS INDEX: 27.6 KG/M2 | SYSTOLIC BLOOD PRESSURE: 102 MMHG | WEIGHT: 150 LBS

## 2021-11-09 LAB
BILIRUB UR QL STRIP: NORMAL
GLUCOSE UR-MCNC: NORMAL
HCG UR QL: 7 EU/DL
HGB UR QL STRIP.AUTO: NORMAL
KETONES UR-MCNC: ABNORMAL
LEUKOCYTE ESTERASE UR QL STRIP: NORMAL
NITRITE UR QL STRIP: NORMAL
PH UR STRIP: 7
PROT UR STRIP-MCNC: NORMAL
SP GR UR STRIP: 1.02

## 2021-11-09 PROCEDURE — 0502F SUBSEQUENT PRENATAL CARE: CPT

## 2021-11-12 ENCOUNTER — INPATIENT (INPATIENT)
Facility: HOSPITAL | Age: 31
LOS: 2 days | Discharge: HOME | End: 2021-11-15
Attending: SPECIALIST | Admitting: SPECIALIST
Payer: COMMERCIAL

## 2021-11-12 VITALS
WEIGHT: 149.91 LBS | HEART RATE: 95 BPM | HEIGHT: 65 IN | TEMPERATURE: 98 F | RESPIRATION RATE: 18 BRPM | SYSTOLIC BLOOD PRESSURE: 105 MMHG | DIASTOLIC BLOOD PRESSURE: 62 MMHG

## 2021-11-12 DIAGNOSIS — O26.893 OTHER SPECIFIED PREGNANCY RELATED CONDITIONS, THIRD TRIMESTER: ICD-10-CM

## 2021-11-12 DIAGNOSIS — O47.1 FALSE LABOR AT OR AFTER 37 COMPLETED WEEKS OF GESTATION: ICD-10-CM

## 2021-11-12 RX ORDER — OXYTOCIN 10 UNIT/ML
333.33 VIAL (ML) INJECTION
Qty: 20 | Refills: 0 | Status: COMPLETED | OUTPATIENT
Start: 2021-11-13 | End: 2021-11-13

## 2021-11-12 RX ORDER — AMPICILLIN TRIHYDRATE 250 MG
1 CAPSULE ORAL EVERY 4 HOURS
Refills: 0 | Status: DISCONTINUED | OUTPATIENT
Start: 2021-11-13 | End: 2021-11-13

## 2021-11-12 RX ORDER — SODIUM CHLORIDE 9 MG/ML
1000 INJECTION, SOLUTION INTRAVENOUS
Refills: 0 | Status: DISCONTINUED | OUTPATIENT
Start: 2021-11-13 | End: 2021-11-13

## 2021-11-12 RX ORDER — CITRIC ACID/SODIUM CITRATE 300-500 MG
30 SOLUTION, ORAL ORAL ONCE
Refills: 0 | Status: COMPLETED | OUTPATIENT
Start: 2021-11-13 | End: 2021-11-13

## 2021-11-13 ENCOUNTER — RESULT REVIEW (OUTPATIENT)
Age: 31
End: 2021-11-13

## 2021-11-13 LAB
ABO RH CONFIRMATION: SIGNIFICANT CHANGE UP
BASOPHILS # BLD AUTO: 0.04 K/UL — SIGNIFICANT CHANGE UP (ref 0–0.2)
BASOPHILS NFR BLD AUTO: 0.6 % — SIGNIFICANT CHANGE UP (ref 0–2)
BLD GP AB SCN SERPL QL: SIGNIFICANT CHANGE UP
COVID-19 SPIKE DOMAIN AB INTERP: NEGATIVE — SIGNIFICANT CHANGE UP
COVID-19 SPIKE DOMAIN ANTIBODY RESULT: 0.4 U/ML — SIGNIFICANT CHANGE UP
EOSINOPHIL # BLD AUTO: 0.04 K/UL — SIGNIFICANT CHANGE UP (ref 0–0.5)
EOSINOPHIL NFR BLD AUTO: 0.6 % — SIGNIFICANT CHANGE UP (ref 0–6)
HCT VFR BLD CALC: 37.8 % — SIGNIFICANT CHANGE UP (ref 34.5–45)
HGB BLD-MCNC: 12.7 G/DL — SIGNIFICANT CHANGE UP (ref 11.5–15.5)
IMM GRANULOCYTES NFR BLD AUTO: 0.4 % — SIGNIFICANT CHANGE UP (ref 0–1.5)
LYMPHOCYTES # BLD AUTO: 1.72 K/UL — SIGNIFICANT CHANGE UP (ref 1–3.3)
LYMPHOCYTES # BLD AUTO: 24.2 % — SIGNIFICANT CHANGE UP (ref 13–44)
MCHC RBC-ENTMCNC: 30.7 PG — SIGNIFICANT CHANGE UP (ref 27–34)
MCHC RBC-ENTMCNC: 33.6 GM/DL — SIGNIFICANT CHANGE UP (ref 32–36)
MCV RBC AUTO: 91.3 FL — SIGNIFICANT CHANGE UP (ref 80–100)
MONOCYTES # BLD AUTO: 0.56 K/UL — SIGNIFICANT CHANGE UP (ref 0–0.9)
MONOCYTES NFR BLD AUTO: 7.9 % — SIGNIFICANT CHANGE UP (ref 2–14)
NEUTROPHILS # BLD AUTO: 4.71 K/UL — SIGNIFICANT CHANGE UP (ref 1.8–7.4)
NEUTROPHILS NFR BLD AUTO: 66.3 % — SIGNIFICANT CHANGE UP (ref 43–77)
PLATELET # BLD AUTO: 203 K/UL — SIGNIFICANT CHANGE UP (ref 150–400)
RBC # BLD: 4.14 M/UL — SIGNIFICANT CHANGE UP (ref 3.8–5.2)
RBC # FLD: 12.6 % — SIGNIFICANT CHANGE UP (ref 10.3–14.5)
SARS-COV-2 IGG+IGM SERPL QL IA: 0.4 U/ML — SIGNIFICANT CHANGE UP
SARS-COV-2 IGG+IGM SERPL QL IA: NEGATIVE — SIGNIFICANT CHANGE UP
SARS-COV-2 RNA SPEC QL NAA+PROBE: SIGNIFICANT CHANGE UP
T PALLIDUM AB TITR SER: NEGATIVE — SIGNIFICANT CHANGE UP
WBC # BLD: 7.1 K/UL — SIGNIFICANT CHANGE UP (ref 3.8–10.5)
WBC # FLD AUTO: 7.1 K/UL — SIGNIFICANT CHANGE UP (ref 3.8–10.5)

## 2021-11-13 PROCEDURE — 59400 OBSTETRICAL CARE: CPT

## 2021-11-13 PROCEDURE — 88307 TISSUE EXAM BY PATHOLOGIST: CPT | Mod: 26

## 2021-11-13 RX ORDER — OXYCODONE HYDROCHLORIDE 5 MG/1
5 TABLET ORAL
Refills: 0 | Status: DISCONTINUED | OUTPATIENT
Start: 2021-11-13 | End: 2021-11-15

## 2021-11-13 RX ORDER — MAGNESIUM HYDROXIDE 400 MG/1
30 TABLET, CHEWABLE ORAL
Refills: 0 | Status: DISCONTINUED | OUTPATIENT
Start: 2021-11-13 | End: 2021-11-15

## 2021-11-13 RX ORDER — ACETAMINOPHEN 500 MG
975 TABLET ORAL
Refills: 0 | Status: DISCONTINUED | OUTPATIENT
Start: 2021-11-13 | End: 2021-11-15

## 2021-11-13 RX ORDER — OXYCODONE HYDROCHLORIDE 5 MG/1
5 TABLET ORAL ONCE
Refills: 0 | Status: DISCONTINUED | OUTPATIENT
Start: 2021-11-13 | End: 2021-11-15

## 2021-11-13 RX ORDER — HYDROCORTISONE 1 %
1 OINTMENT (GRAM) TOPICAL EVERY 6 HOURS
Refills: 0 | Status: DISCONTINUED | OUTPATIENT
Start: 2021-11-13 | End: 2021-11-15

## 2021-11-13 RX ORDER — DIPHENHYDRAMINE HCL 50 MG
25 CAPSULE ORAL EVERY 6 HOURS
Refills: 0 | Status: DISCONTINUED | OUTPATIENT
Start: 2021-11-13 | End: 2021-11-15

## 2021-11-13 RX ORDER — BENZOCAINE 10 %
1 GEL (GRAM) MUCOUS MEMBRANE EVERY 6 HOURS
Refills: 0 | Status: DISCONTINUED | OUTPATIENT
Start: 2021-11-13 | End: 2021-11-15

## 2021-11-13 RX ORDER — SIMETHICONE 80 MG/1
80 TABLET, CHEWABLE ORAL EVERY 4 HOURS
Refills: 0 | Status: DISCONTINUED | OUTPATIENT
Start: 2021-11-13 | End: 2021-11-15

## 2021-11-13 RX ORDER — OXYTOCIN 10 UNIT/ML
333.33 VIAL (ML) INJECTION
Qty: 20 | Refills: 0 | Status: DISCONTINUED | OUTPATIENT
Start: 2021-11-13 | End: 2021-11-15

## 2021-11-13 RX ORDER — SODIUM CHLORIDE 9 MG/ML
3 INJECTION INTRAMUSCULAR; INTRAVENOUS; SUBCUTANEOUS EVERY 8 HOURS
Refills: 0 | Status: DISCONTINUED | OUTPATIENT
Start: 2021-11-13 | End: 2021-11-15

## 2021-11-13 RX ORDER — AMPICILLIN TRIHYDRATE 250 MG
2 CAPSULE ORAL ONCE
Refills: 0 | Status: COMPLETED | OUTPATIENT
Start: 2021-11-13 | End: 2021-11-13

## 2021-11-13 RX ORDER — PRAMOXINE HYDROCHLORIDE 150 MG/15G
1 AEROSOL, FOAM RECTAL EVERY 4 HOURS
Refills: 0 | Status: DISCONTINUED | OUTPATIENT
Start: 2021-11-13 | End: 2021-11-15

## 2021-11-13 RX ORDER — AER TRAVELER 0.5 G/1
1 SOLUTION RECTAL; TOPICAL EVERY 4 HOURS
Refills: 0 | Status: DISCONTINUED | OUTPATIENT
Start: 2021-11-13 | End: 2021-11-15

## 2021-11-13 RX ORDER — KETOROLAC TROMETHAMINE 30 MG/ML
30 SYRINGE (ML) INJECTION ONCE
Refills: 0 | Status: DISCONTINUED | OUTPATIENT
Start: 2021-11-13 | End: 2021-11-15

## 2021-11-13 RX ORDER — DIBUCAINE 1 %
1 OINTMENT (GRAM) RECTAL EVERY 6 HOURS
Refills: 0 | Status: DISCONTINUED | OUTPATIENT
Start: 2021-11-13 | End: 2021-11-15

## 2021-11-13 RX ORDER — TETANUS TOXOID, REDUCED DIPHTHERIA TOXOID AND ACELLULAR PERTUSSIS VACCINE, ADSORBED 5; 2.5; 8; 8; 2.5 [IU]/.5ML; [IU]/.5ML; UG/.5ML; UG/.5ML; UG/.5ML
0.5 SUSPENSION INTRAMUSCULAR ONCE
Refills: 0 | Status: DISCONTINUED | OUTPATIENT
Start: 2021-11-13 | End: 2021-11-15

## 2021-11-13 RX ORDER — IBUPROFEN 200 MG
600 TABLET ORAL EVERY 6 HOURS
Refills: 0 | Status: COMPLETED | OUTPATIENT
Start: 2021-11-13 | End: 2022-10-12

## 2021-11-13 RX ORDER — LANOLIN
1 OINTMENT (GRAM) TOPICAL EVERY 6 HOURS
Refills: 0 | Status: DISCONTINUED | OUTPATIENT
Start: 2021-11-13 | End: 2021-11-15

## 2021-11-13 RX ADMIN — Medication 1000 MILLIUNIT(S)/MIN: at 20:16

## 2021-11-13 RX ADMIN — Medication 216 GRAM(S): at 00:24

## 2021-11-13 RX ADMIN — Medication 108 GRAM(S): at 16:30

## 2021-11-13 RX ADMIN — SODIUM CHLORIDE 3 MILLILITER(S): 9 INJECTION INTRAMUSCULAR; INTRAVENOUS; SUBCUTANEOUS at 22:45

## 2021-11-13 RX ADMIN — Medication 108 GRAM(S): at 04:10

## 2021-11-13 RX ADMIN — Medication 108 GRAM(S): at 12:23

## 2021-11-13 RX ADMIN — Medication 108 GRAM(S): at 08:13

## 2021-11-13 RX ADMIN — Medication 30 MILLILITER(S): at 15:24

## 2021-11-13 NOTE — OB PROVIDER DELIVERY SUMMARY - NSPROVIDERDELIVERYNOTE_OBGYN_ALL_OB_FT
Well controlled vaginal delivery over an intact perineum, Vigorous Female baby, DOA, Apgars 9/9, PDSI, 3VC, cord pH sent, placenta sent. Uterus, cervix and upper vagina intact, second degree median laceration repaired after 1 % lidocaine. Rectum and sphincter intact. No complications.

## 2021-11-13 NOTE — OB PROVIDER H&P - ASSESSMENT
A/P: 31y  at 38w6d GA who presents to L&D for SROM at 2145.   -Admit to L&D  -Consent  -Admission labs  -IV fluids  -Labor: SROM @2145, clear. Latent labor. Esdras q6min.   -Fetus: Cat I tracing. Continuous toco and fetal monitoring.   -GBS: positive, ampicillin started  -Analgesia: desires ampicillin    Discussed with Dr. Espinal

## 2021-11-13 NOTE — OB PROVIDER IHI INDUCTION/AUGMENTATION NOTE - LABOR: CERVICAL POSITION
posterior You can access the FollowMyHealth Patient Portal offered by [facility] by registering at the following website: [email or web address]. By joining [employer]’s FollowMyHealth portal, you will also be able to view your health information using other applications (apps) compatible with our system.

## 2021-11-13 NOTE — CHART NOTE - NSCHARTNOTEFT_GEN_A_CORE
Ronel is doing well, she is sitting up and feels rectal pressure with contractions. She is fully dilated at +1. Good fetal response to scalp stimulation. We will begin pushing. All questions answered,  at bedside.

## 2021-11-13 NOTE — CHART NOTE - NSCHARTNOTEFT_GEN_A_CORE
Ronel is comfortable with her epidural and she is progressing. She had some variable decelerations and she has an otherwise stable FHR tracing. On VE the cervix was ant lip/100/+1 and there was an excellent response to scalp stimulation. We discussed the progress and anticipation of a vaginal delivery. All questions were answered.

## 2021-11-13 NOTE — OB PROVIDER LABOR PROGRESS NOTE - NS_SUBJECTIVE/OBJECTIVE_OBGYN_ALL_OB_FT
pt comfortable w epi in place. feeling more pelvic pressure    Vital Signs Last 24 Hrs  T(C): 36.8 (13 Nov 2021 12:01), Max: 36.8 (13 Nov 2021 04:07)  T(F): 98.24 (13 Nov 2021 12:01), Max: 98.24 (13 Nov 2021 10:28)  HR: 97 (13 Nov 2021 18:28) (57 - 108)  BP: 109/65 (13 Nov 2021 18:18) (90/- - 113/68)  RR: 17 (13 Nov 2021 12:01) (16 - 18)  SpO2: 99% (13 Nov 2021 18:13) (97% - 100%)

## 2021-11-13 NOTE — OB RN DELIVERY SUMMARY - NS_SEPSISRSKCALC_OBGYN_ALL_OB_FT
No temperature has been documented for this patient in CPN or on the OB Flowsheet. Ensure the highest temperature during labor was documented on the OB Flowsheet.  No gestational age at birth has been documented. Ensure delivery date/time has been entered above.  Rupture of membranes must be entered above.   EOS calculated successfully. EOS Risk Factor: 0.5/1000 live births ([facility] national incidence); GA=38w6d; Temp=98.6; ROM=22.517; GBS='Positive'; Antibiotics='GBS specific antibiotics > 2 hrs prior to birth'

## 2021-11-13 NOTE — OB PROVIDER DELIVERY SUMMARY - NSSELHIDDEN_OBGYN_ALL_OB_FT
[NS_DeliveryAttending1_OBGYN_ALL_OB_FT:[id number]==],[NS_DeliveryAssist1_OBGYN_ALL_OB_FT:[id number]=],[NS_DeliveryRN_OBGYN_ALL_OB_FT:[id number]=]

## 2021-11-13 NOTE — OB PROVIDER H&P - HISTORY OF PRESENT ILLNESS
31y  at 38w6d GA who presents to L&D for loss of fluid at 2145. Patient describes multiple big gushes of clear fluid. Patient denies vaginal bleeding and contractions. She endorses good fetal movement. Denies fevers, chills, nausea, vomiting, chest pain, SOB, dizziness and headache. No other complaints at this time.     MONTANA: 20    Prenatal course uncomplicated.      POB: denies   PGYN: -fibroids, -ovarian cysts, denies STD hx, denies abnormal PAPs   PMH: hx of VSD, follows with cardiologist outpatient  PSH: Denies  SH: Denies EtOH, tobacco and illicit drug use during this pregnancy  Meds: PNVs  Allergies: NKDA

## 2021-11-13 NOTE — OB PROVIDER H&P - NSHPPHYSICALEXAM_GEN_ALL_CORE
T(C): 36.7 (11-12-21 @ 23:35), Max: 36.7 (11-12-21 @ 23:35)  HR: 95 (11-12-21 @ 23:48) (95 - 95)  BP: 105/62 (11-12-21 @ 23:48) (105/62 - 105/62)  RR: 18 (11-12-21 @ 23:35) (18 - 18)    Gen: NAD, well-appearing, AAOx3   Abd: Soft, gravid  Ext: non-tender, non-edematous    SVE:  1/30/-3. Gross fluid leakage visualized.  Bedside sono: VTX, anterior placenta  FHT: baseline 135, moderate variability, +accels, -decels   [place]: CTX q6min

## 2021-11-13 NOTE — OB RN DELIVERY SUMMARY - NSSELHIDDEN_OBGYN_ALL_OB_FT
[NS_DeliveryAttending1_OBGYN_ALL_OB_FT:[id number]==],[NS_DeliveryAssist1_OBGYN_ALL_OB_FT:[email or web address]=],[NS_DeliveryRN_OBGYN_ALL_OB_FT:[id number]=]

## 2021-11-13 NOTE — OB PROVIDER LABOR PROGRESS NOTE - ASSESSMENT
VSS  cat 2 tracing. resuscitate and reposition as necessary  continuous fetal and toco monitoring  GBS pos, on ampicillin    discussed with Dr. Blackman

## 2021-11-14 ENCOUNTER — TRANSCRIPTION ENCOUNTER (OUTPATIENT)
Age: 31
End: 2021-11-14

## 2021-11-14 LAB
COVID-19 NUCLEOCAPSID GAM AB INTERP: NEGATIVE — SIGNIFICANT CHANGE UP
COVID-19 NUCLEOCAPSID TOTAL GAM ANTIBODY RESULT: 0.07 INDEX — SIGNIFICANT CHANGE UP
HCT VFR BLD CALC: 36.3 % — SIGNIFICANT CHANGE UP (ref 34.5–45)
HGB BLD-MCNC: 12.1 G/DL — SIGNIFICANT CHANGE UP (ref 11.5–15.5)
SARS-COV-2 IGG+IGM SERPL QL IA: 0.07 INDEX — SIGNIFICANT CHANGE UP
SARS-COV-2 IGG+IGM SERPL QL IA: NEGATIVE — SIGNIFICANT CHANGE UP

## 2021-11-14 RX ORDER — IBUPROFEN 200 MG
600 TABLET ORAL EVERY 6 HOURS
Refills: 0 | Status: DISCONTINUED | OUTPATIENT
Start: 2021-11-14 | End: 2021-11-15

## 2021-11-14 RX ORDER — ACETAMINOPHEN 500 MG
3 TABLET ORAL
Qty: 0 | Refills: 0 | DISCHARGE
Start: 2021-11-14

## 2021-11-14 RX ORDER — IBUPROFEN 200 MG
1 TABLET ORAL
Qty: 0 | Refills: 0 | DISCHARGE
Start: 2021-11-14

## 2021-11-14 RX ADMIN — Medication 975 MILLIGRAM(S): at 15:36

## 2021-11-14 RX ADMIN — Medication 600 MILLIGRAM(S): at 00:45

## 2021-11-14 RX ADMIN — Medication 600 MILLIGRAM(S): at 12:45

## 2021-11-14 RX ADMIN — Medication 1 TABLET(S): at 12:16

## 2021-11-14 RX ADMIN — SODIUM CHLORIDE 3 MILLILITER(S): 9 INJECTION INTRAMUSCULAR; INTRAVENOUS; SUBCUTANEOUS at 06:43

## 2021-11-14 RX ADMIN — Medication 975 MILLIGRAM(S): at 21:27

## 2021-11-14 RX ADMIN — Medication 600 MILLIGRAM(S): at 12:15

## 2021-11-14 RX ADMIN — Medication 975 MILLIGRAM(S): at 21:57

## 2021-11-14 RX ADMIN — Medication 975 MILLIGRAM(S): at 09:30

## 2021-11-14 RX ADMIN — Medication 975 MILLIGRAM(S): at 09:00

## 2021-11-14 RX ADMIN — Medication 600 MILLIGRAM(S): at 18:02

## 2021-11-14 RX ADMIN — Medication 600 MILLIGRAM(S): at 01:30

## 2021-11-14 RX ADMIN — Medication 975 MILLIGRAM(S): at 16:06

## 2021-11-14 NOTE — DISCHARGE NOTE OB - CARE PLAN
Principal Discharge DX:	Delivery normal  Assessment and plan of treatment:	1) Please take ibuprofen and/or Tylenol as needed for pain as prescribed.  2) Nothing in the vagina for 6 weeks (including no sex, no tampons, and no douching).  3) Please call your doctor for a follow up your postpartum appointment in 4 weeks.  4) Please continue taking vitamins postpartum. Take iron and colace for acute blood loss anemia.  5) Please call the office sooner if you have heavy vaginal bleeding, severe abdominal pain, or fever > 100.4F.  6) You may resume regular daily activity as tolerated   1

## 2021-11-14 NOTE — DISCHARGE NOTE OB - CARE PROVIDER_API CALL
Evaristo Blackman (MD)  Obstetrics and Gynecology  [address] [place] [address]  Phone: [phone]  Fax: [phone]  Follow Up Time:

## 2021-11-14 NOTE — DISCHARGE NOTE OB - COMPLETE THE FOLLOWING IF THE PATIENT REFUSES THE INFLUENZA VACCINE:
This document is complete and the patient is ready for discharge. Risks/benefits discussed with patient/surrogate

## 2021-11-14 NOTE — DISCHARGE NOTE OB - MATERIALS PROVIDED
Breastfeeding Log/Breastfeeding Mother’s Support Group Information/Guide to Postpartum Care/[facility] Hearing Screen Program/Back To Sleep Handout/Shaken Baby Prevention Handout/Breastfeeding Guide and Packet/Birth Certificate Instructions/Tdap Vaccination (VIS Pub Date: January 24, 2012)

## 2021-11-14 NOTE — DISCHARGE NOTE OB - PATIENT PORTAL LINK FT
You can access the FollowMyHealth Patient Portal offered by [facility] by registering at the following website: [email or web address]. By joining [employer]’s FollowMyHealth portal, you will also be able to view your health information using other applications (apps) compatible with our system.

## 2021-11-15 VITALS
OXYGEN SATURATION: 96 % | DIASTOLIC BLOOD PRESSURE: 78 MMHG | TEMPERATURE: 98 F | SYSTOLIC BLOOD PRESSURE: 117 MMHG | HEART RATE: 72 BPM | RESPIRATION RATE: 16 BRPM

## 2021-11-15 PROCEDURE — 86850 RBC ANTIBODY SCREEN: CPT

## 2021-11-15 PROCEDURE — 85025 COMPLETE CBC W/AUTO DIFF WBC: CPT

## 2021-11-15 PROCEDURE — 86769 SARS-COV-2 COVID-19 ANTIBODY: CPT

## 2021-11-15 PROCEDURE — G0463: CPT

## 2021-11-15 PROCEDURE — 85018 HEMOGLOBIN: CPT

## 2021-11-15 PROCEDURE — 59050 FETAL MONITOR W/REPORT: CPT

## 2021-11-15 PROCEDURE — 86901 BLOOD TYPING SEROLOGIC RH(D): CPT

## 2021-11-15 PROCEDURE — U0003: CPT

## 2021-11-15 PROCEDURE — U0005: CPT

## 2021-11-15 PROCEDURE — 85014 HEMATOCRIT: CPT

## 2021-11-15 PROCEDURE — 36415 COLL VENOUS BLD VENIPUNCTURE: CPT

## 2021-11-15 PROCEDURE — 86780 TREPONEMA PALLIDUM: CPT

## 2021-11-15 PROCEDURE — 59025 FETAL NON-STRESS TEST: CPT

## 2021-11-15 PROCEDURE — 86900 BLOOD TYPING SEROLOGIC ABO: CPT

## 2021-11-15 PROCEDURE — 88307 TISSUE EXAM BY PATHOLOGIST: CPT

## 2021-11-15 RX ADMIN — Medication 600 MILLIGRAM(S): at 03:38

## 2021-11-15 RX ADMIN — Medication 600 MILLIGRAM(S): at 03:08

## 2021-11-15 RX ADMIN — Medication 975 MILLIGRAM(S): at 09:33

## 2021-11-15 NOTE — PROGRESS NOTE ADULT - SUBJECTIVE AND OBJECTIVE BOX
MEGAN PEREZ is a 31y  now PPD#2 s/p spontaneous vaginal delivery at 38.6 weeks gestation, uncomplicated.    S:    No acute events overnight.   The patient has no complaints.  Pain controlled with current treatment regimen.   She is ambulating without difficulty and tolerating PO.   + flatus/-BM/+ voiding   She endorses appropriate lochia, which is decreasing.   She is breastfeeding without difficulty.   She denies fevers, chills, nausea and vomiting.   She denies lightheadedness, dizziness, palpitations, chest pain and SOB.     O:    T(C): 36.4 (11-15-21 @ 04:00), Max: 36.6 (21 @ 16:49)  HR: 72 (11-15-21 @ 04:00) (60 - 72)  BP: 117/78 (11-15-21 @ 04:00) (107/65 - 117/78)  RR: 16 (11-15-21 @ 04:00) (16 - 16)  SpO2: 96% (11-15-21 @ 04:00) (96% - 96%)    Gen: NAD, AOx3  CV: RRR, S1/S2 present  Pulm: CTAB  Abdomen:  Soft, non-tender, non-distended, +bowel sounds  Uterus:  Fundus firm below umbilicus  VE:  Expected lochia  Ext:  b/l LE non-tender                           12.1   x     )-----------( x        ( 2021 05:18 )             36.3       
MEGAN PEREZ is a 31y  now PPD#1 s/p spontaneous vaginal delivery at 38.6 weeks gestation, uncomplicated.    S:    No acute events overnight.   The patient has numbness in L thigh area but wearing off slowly. Otherwise no complaints  Pain controlled with current treatment regimen.   She is ambulating without difficulty and tolerating PO.   + flatus/-BM/+ voiding   She endorses appropriate lochia, which is decreasing.   She is breastfeeding without difficulty.       O:    T(C): 36.8 (21 @ 04:39), Max: 37.0 (21 @ 18:04)  HR: 76 (21 @ 04:39) (57 - 142)  BP: 118/71 (21 @ 04:39) (90/- - 152/68)  RR: 16 (21 @ 04:39) (16 - 18)  SpO2: 96% (21 @ 04:39) (96% - 100%)    Gen: NAD, AOx3  CV: RRR, S1/S2 present  Pulm: CTAB  Breast: Nontender, non-engorged   Abdomen:  Soft, non-tender, non-distended, +bowel sounds  Uterus:  Fundus firm below umbilicus  VE:  Expectant lochia  Ext:  Non-tender and non-edematous                          12.1   x     )-----------( x        ( 2021 05:18 )             36.3

## 2021-11-15 NOTE — PROGRESS NOTE ADULT - ASSESSMENT
A/P:   31y  now PPD#1 s/p spontaneous vaginal delivery at 38.6 weeks gestation, uncomplicated.  -Vital signs stable  -Hgb: 12.7 -> AM labs pending   -Voiding, tolerating PO, bowel function nml   -Advance care as tolerated   -Continue routine postpartum care and education  -Healthy female infant  -Dispo: Patient to be discharged when meeting all postpartum and postoperative milestones and pending attending approval.
A/P:  MEGAN PEREZ is a 31y  now PPD#2 s/p spontaneous vaginal delivery at 38.6 weeks gestation, uncomplicated.  -Vital signs stable  -Hgb: 12.7>12.1  -Voiding, tolerating PO  -Advance care as tolerated   -Continue routine postpartum care and education  -Healthy female infant  -Dispo: Anticipate discharge to home pending attending approval.

## 2021-11-15 NOTE — PROGRESS NOTE ADULT - ATTENDING COMMENTS
31y  now PPD#2 s/p spontaneous vaginal delivery at 38.6 weeks, stable postpartum. Discharge instructions reviewed.

## 2021-11-16 ENCOUNTER — APPOINTMENT (OUTPATIENT)
Dept: OBGYN | Facility: CLINIC | Age: 31
End: 2021-11-16

## 2021-11-22 ENCOUNTER — NON-APPOINTMENT (OUTPATIENT)
Age: 31
End: 2021-11-22

## 2021-11-30 LAB — SURGICAL PATHOLOGY STUDY: SIGNIFICANT CHANGE UP

## 2021-12-02 ENCOUNTER — APPOINTMENT (OUTPATIENT)
Dept: OBGYN | Facility: CLINIC | Age: 31
End: 2021-12-02

## 2021-12-02 ENCOUNTER — APPOINTMENT (OUTPATIENT)
Dept: OBGYN | Facility: CLINIC | Age: 31
End: 2021-12-02
Payer: COMMERCIAL

## 2021-12-02 VITALS
WEIGHT: 131 LBS | HEIGHT: 62 IN | DIASTOLIC BLOOD PRESSURE: 58 MMHG | SYSTOLIC BLOOD PRESSURE: 102 MMHG | TEMPERATURE: 98.2 F | BODY MASS INDEX: 24.11 KG/M2

## 2021-12-02 PROCEDURE — 99214 OFFICE O/P EST MOD 30 MIN: CPT | Mod: 24

## 2021-12-16 ENCOUNTER — APPOINTMENT (OUTPATIENT)
Dept: OBGYN | Facility: CLINIC | Age: 31
End: 2021-12-16
Payer: COMMERCIAL

## 2021-12-16 VITALS
DIASTOLIC BLOOD PRESSURE: 58 MMHG | SYSTOLIC BLOOD PRESSURE: 106 MMHG | WEIGHT: 127 LBS | HEIGHT: 65 IN | BODY MASS INDEX: 21.16 KG/M2

## 2021-12-16 PROCEDURE — 0503F POSTPARTUM CARE VISIT: CPT

## 2021-12-16 RX ORDER — AMOXICILLIN 875 MG/1
875 TABLET, FILM COATED ORAL
Qty: 20 | Refills: 0 | Status: DISCONTINUED | COMMUNITY
Start: 2021-12-02 | End: 2021-12-16

## 2021-12-16 RX ORDER — SACCHAROMYCES BOULARDII 50 MG
250 CAPSULE ORAL TWICE DAILY
Qty: 120 | Refills: 3 | Status: DISCONTINUED | COMMUNITY
Start: 2021-12-02 | End: 2021-12-16

## 2021-12-16 RX ORDER — VITAMIN C, CALCIUM, IRON, VITAMIN D3, VITAMIN E, THIAMIN, RIBOFLAVIN, NIACINAMIDE, VITAMIN B6, FOLIC ACID, IODINE, ZINC, COPPER, DOCUSATE SODIUM 120; 85; 30; 3; 20; 20; 1; 25; 2; 50; 159; 4.54; 150; 5; 400; 3.4 MG/1; MG/1; [IU]/1; MG/1; MG/1; MG/1; MG/1; MG/1; MG/1; MG/1; MG/1; MG/1; UG/1; MG/1; [IU]/1; MG/1
90-1 & 300 TABLET ORAL
Qty: 2 | Refills: 1 | Status: DISCONTINUED | COMMUNITY
Start: 2021-05-04 | End: 2021-12-16

## 2021-12-18 NOTE — PHYSICAL EXAM
[Examination Of The Breasts] : a normal appearance [No Discharge] : no discharge [No Masses] : no breast masses were palpable [Labia Majora] : normal [Labia Minora] : normal [No Bleeding] : There was no active vaginal bleeding [Normal] : normal [Uterine Adnexae] : normal [FreeTextEntry6] : mastitis seen on right upper outer quadrant of the right breast

## 2021-12-18 NOTE — HISTORY OF PRESENT ILLNESS
[N] : Patient is not sexually active [Y] : Positive pregnancy history [Menarche Age: ____] : age at menarche was [unfilled] [Previously active] : previously active [TextBox_4] :  22, GIRL " LORENA" 7.15 LBS, BREAST FEEDING  [BreastSonogramDate] : 1/20/20 [TextBox_25] : BR4 [PapSmeardate] : 8/25/20 [TextBox_31] : NEG [LMPDate] : 2/14/21 [PGxTotal] : 1 [[facility] : [facility] [[facility]] : 1 [FreeTextEntry1] : 2/14/21

## 2021-12-18 NOTE — END OF VISIT
[FreeTextEntry3] : I, [Kalpesh Eldridge] solely acted as scribe for Dr. Evaristo Blackman on 12/02/2021.\par All medical entries made by the Scribe were at my, Dr. Blackman’s, direction and personally\par dictated by me on 12/02/2021.  I have reviewed the chart and agree that the record\par accurately reflects my personal performance of the history, physical exam, assessment and plan. I\par have also personally directed, reviewed, and agreed with the chart.

## 2021-12-18 NOTE — DISCUSSION/SUMMARY
[FreeTextEntry1] : -Mastitis noted on breast exam due to breastfeeding. Recommended to massage the breast and put a warm compress on it. Advised the fever and headache is also related to her breastfeeding. Prescriptions for Amoxicillin and Florastor ordered today. \par \par -Recommended to put A & D ointment to put on the skin where the pad rubs.\par \par -Dissolvable stitches noted on exam. Everything else is within normal limits.\par \par -Advised she can workout now but wear a good supportive bra. Recommended to also stay well hydrated.\par \par -Follow up in 2 weeks or as needed.\par \par During this visit 20 minutes were spent face-to-face with greater than 50% of the time dedicated\par to counseling.

## 2021-12-28 ENCOUNTER — APPOINTMENT (OUTPATIENT)
Dept: OBGYN | Facility: CLINIC | Age: 31
End: 2021-12-28
Payer: COMMERCIAL

## 2021-12-28 VITALS
BODY MASS INDEX: 21.16 KG/M2 | WEIGHT: 127 LBS | HEIGHT: 65 IN | SYSTOLIC BLOOD PRESSURE: 110 MMHG | DIASTOLIC BLOOD PRESSURE: 62 MMHG

## 2021-12-28 PROCEDURE — 99213 OFFICE O/P EST LOW 20 MIN: CPT

## 2021-12-28 PROCEDURE — 36415 COLL VENOUS BLD VENIPUNCTURE: CPT

## 2021-12-28 NOTE — DISCUSSION/SUMMARY
[FreeTextEntry1] : -Breast exam: Bilateral tenderness. No sign of infection. No erythema. Likely breast engorgement. Her prior mastitis as in the right breast and now she is tender under the left breast as she slept through a feeding time and awoke engorged.\par \par -Recommended wearing a supportive bra, warm compresses PRN, radial breast massages, and Motrin 600 MG q 6 hours.\par \par -CBC ordered. \par \par -Follow up in 2 months for annual gynecology exam or as needed.\par \par During this visit 20 minutes were spent face- to- face with greater than 50% of the time dedicated to counseling.

## 2021-12-28 NOTE — HISTORY OF PRESENT ILLNESS
[N] : Patient is not sexually active [Y] : Positive pregnancy history [Menarche Age: ____] : age at menarche was [unfilled] [Previously active] : previously active [No] : No [BreastSonogramDate] : 01/20/20 [TextBox_25] : BR4 [PapSmeardate] : 08/25/20 [TextBox_31] : NEG [HPVDate] : 08/25/20 [TextBox_78] : NEG [LMPDate] : 02/14/21 [PGxTotal] : 1 [[facility]] : 1 [[facility]] : 1 [FreeTextEntry1] : 02/14/21

## 2021-12-28 NOTE — END OF VISIT
[FreeTextEntry3] : I, Paola De Oliveira solely acted as a scribe for Dr. Evaristo Blackman on 12/28/2021 . All medical entries made by the scribe were at my, Dr. Blackman's, direction and personally dictated by me on 12/28/2021 . I have reviewed the chart and agree that the record accurately reflects my personal performance of the history, physical exam, assessment and plan. I have also personally directed, reviewed, and agreed with the chart.

## 2021-12-28 NOTE — PHYSICAL EXAM
[Chaperone Present] : A chaperone was present in the examining room during all aspects of the physical examination [Appropriately responsive] : appropriately responsive [Alert] : alert [No Acute Distress] : no acute distress [Oriented x3] : oriented x3 [Examination Of The Breasts] : a normal appearance [Normal] : normal [Breast Abnormal Secretion Opalescent Fluid] : a milky discharge [Tenderness Of The Left Breast] : tenderness [No Masses] : no breast masses were palpable [FreeTextEntry1] : JOAQUIN Bhatia

## 2022-01-04 ENCOUNTER — NON-APPOINTMENT (OUTPATIENT)
Age: 32
End: 2022-01-04

## 2022-01-10 NOTE — DISCHARGE NOTE OB - NSTOBACCONEVERSMOKERY/N_GEN_A
Plan:  1.  Dialysis next due 1/11  2.  OK for discharge from renal standpoint. Follow up will be via dialysis clinic   No

## 2022-01-16 LAB
BASOPHILS # BLD AUTO: 0.03 K/UL
BASOPHILS NFR BLD AUTO: 0.3 %
EOSINOPHIL # BLD AUTO: 0.02 K/UL
EOSINOPHIL NFR BLD AUTO: 0.2 %
HCT VFR BLD CALC: 42.3 %
HGB BLD-MCNC: 13.3 G/DL
IMM GRANULOCYTES NFR BLD AUTO: 0.3 %
LYMPHOCYTES # BLD AUTO: 1.17 K/UL
LYMPHOCYTES NFR BLD AUTO: 12.7 %
MAN DIFF?: NORMAL
MCHC RBC-ENTMCNC: 30.7 PG
MCHC RBC-ENTMCNC: 31.4 GM/DL
MCV RBC AUTO: 97.7 FL
MONOCYTES # BLD AUTO: 0.53 K/UL
MONOCYTES NFR BLD AUTO: 5.7 %
NEUTROPHILS # BLD AUTO: 7.44 K/UL
NEUTROPHILS NFR BLD AUTO: 80.8 %
PLATELET # BLD AUTO: 252 K/UL
RBC # BLD: 4.33 M/UL
RBC # FLD: 12.8 %
WBC # FLD AUTO: 9.22 K/UL

## 2022-01-16 NOTE — END OF VISIT
[FreeTextEntry3] : I, [Kalpesh Eldridge] solely acted as scribe for Dr. Evaristo Blackman on 12/16/2021.\par All medical entries made by the Scribe were at my, Dr. Blackman’s, direction and personally\par dictated by me on 12/16/2021.  I have reviewed the chart and agree that the record\par accurately reflects my personal performance of the history, physical exam, assessment and plan. I\par have also personally directed, reviewed, and agreed with the chart.

## 2022-01-16 NOTE — HISTORY OF PRESENT ILLNESS
[N] : Patient is not sexually active [Y] : Positive pregnancy history [Menarche Age: ____] : age at menarche was [unfilled] [Previously active] : previously active [PapSmeardate] : 8/25/20 [TextBox_31] : NEG [GonorrheaDate] : 4/19/21 [TextBox_63] : NEG [ChlamydiaDate] : 4/19/21 [TextBox_68] : NEG [HPVDate] : 8/25/20 [TextBox_78] : NEG [LMPDate] : 2/14/2021 [PGxTotal] : 1 [[facility] CenterxFulerm] : 1 [[facility]] : 1 [FreeTextEntry1] : 2/14/21

## 2022-01-16 NOTE — DISCUSSION/SUMMARY
[FreeTextEntry1] : -Stitches are dissolving. Normal vaginal exam today. \par \par -Recommended IUD insertion for contraception. Mirena IUD insertion details discussed using a pelvic model. Mirena IUD pamphlet given today. All questions and concerns were discussed.\par \par -She will follow up in 2 months for annual exam or as needed.\par \par During this visit 20 minutes were spent face-to-face with greater than 50% of the time dedicated\par to counseling.

## 2022-01-16 NOTE — PHYSICAL EXAM
[Appropriately responsive] : appropriately responsive [Alert] : alert [No Acute Distress] : no acute distress [No Lymphadenopathy] : no lymphadenopathy [Regular Rate Rhythm] : regular rate rhythm [No Murmurs] : no murmurs [Clear to Auscultation B/L] : clear to auscultation bilaterally [Soft] : soft [Non-tender] : non-tender [Non-distended] : non-distended [No HSM] : No HSM [No Lesions] : no lesions [No Mass] : no mass [Oriented x3] : oriented x3 [Examination Of The Breasts] : a normal appearance [No Discharge] : no discharge [No Masses] : no breast masses were palpable [Labia Majora] : normal [Labia Minora] : normal [Scant] : There was scant vaginal bleeding [Normal] : normal [Uterine Adnexae] : normal [FreeTextEntry1] : Carolyn

## 2022-02-15 ENCOUNTER — APPOINTMENT (OUTPATIENT)
Dept: OBGYN | Facility: CLINIC | Age: 32
End: 2022-02-15
Payer: COMMERCIAL

## 2022-02-15 VITALS
HEIGHT: 65 IN | DIASTOLIC BLOOD PRESSURE: 68 MMHG | WEIGHT: 122 LBS | BODY MASS INDEX: 20.33 KG/M2 | SYSTOLIC BLOOD PRESSURE: 102 MMHG

## 2022-02-15 PROCEDURE — 99395 PREV VISIT EST AGE 18-39: CPT

## 2022-03-10 LAB
CYTOLOGY CVX/VAG DOC THIN PREP: NORMAL
HPV HIGH+LOW RISK DNA PNL CVX: NOT DETECTED

## 2022-03-10 NOTE — HISTORY OF PRESENT ILLNESS
[N] : Patient does not use contraception [Y] : Positive pregnancy history [Menarche Age: ____] : age at menarche was [unfilled] [No] : Patient does not have concerns regarding sex [Currently Active] : currently active [Men] : men [LMP unknown] : LMP unknown [unknown] : Patient is unsure of the date of her LMP [FreeTextEntry1] : MEGAN presents today for an annual exam. She is doing well with no gynecological complaints. \par \par Last pap smear on 08/25/20 was normal, HPV negative. \par \par She declines any birth control options.  [TextBox_4] : Pt presents for an annual exam [PapSmeardate] : 08/25/2020 [TextBox_31] : NORMAL [HPVDate] : 08/25/2020 [TextBox_78] : NEG [PGxTotal] : 1 [[facility]] : 1 [[facility]] : 1

## 2022-03-10 NOTE — DISCUSSION/SUMMARY
[FreeTextEntry1] : During this visit comprehensive counseling was given regarding the following concerns:\par \par 1 We discussed the need for proper nutrition and exercise. This includes cardiovascular and\par pelvic floor exercise.\par \par 2 We discussed the importance of maintaining a proper vaccination schedule and we discussed\par the utility of the flu vaccine and TDaP.\par \par 3 We discussed the impact of chronic sun exposure and the risk for skin disease as a result. The\par benefits of a total body scan by a Dermatologist was reviewed..\par \par 4 We discussed the need for certain supplements for most people which include vitamin D3,\par calcium rich foods with limitation on calcium supplementation by tabular form to 600 mg by\par mouth daily. As part of a bone health program we recommend weight bearing exercises, and\par some limited sun exposure (10-15 minutes daily) to help convert vitamin D to its active form.\par \par 5 Benign breast and pelvic exam. Pap smear collected. \par \par 6 Recommended the Caya diaphragm for contraception management. \par \par 7 She will follow up annually and as needed. \par \par During this visit 20 minutes were spent face-to-face with greater than 50% of the time dedicated\par to counseling.

## 2022-03-10 NOTE — END OF VISIT
[FreeTextEntry3] : I, Jonnyalesia Posey solely acted as scribe for Dr. Evaristo Blackman on 02/15/2022.\par All medical entries made by the Scribe were at my, Dr. Blackman’s, direction and personally\par dictated by me on 02/15/2022.  I have reviewed the chart and agree that the record\par accurately reflects my personal performance of the history, physical exam, assessment and plan. I\par have also personally directed, reviewed, and agreed with the chart.

## 2022-03-10 NOTE — PHYSICAL EXAM
[Chaperone Present] : A chaperone was present in the examining room during all aspects of the physical examination [Appropriately responsive] : appropriately responsive [Alert] : alert [No Acute Distress] : no acute distress [No Lymphadenopathy] : no lymphadenopathy [Regular Rate Rhythm] : regular rate rhythm [No Murmurs] : no murmurs [Clear to Auscultation B/L] : clear to auscultation bilaterally [Soft] : soft [Non-tender] : non-tender [Non-distended] : non-distended [No HSM] : No HSM [No Lesions] : no lesions [No Mass] : no mass [Oriented x3] : oriented x3 [Examination Of The Breasts] : a normal appearance [No Discharge] : no discharge [No Masses] : no breast masses were palpable [Labia Majora] : normal [Labia Minora] : normal [No Bleeding] : There was no active vaginal bleeding [Normal] : normal [Uterine Adnexae] : normal [FreeTextEntry1] : MA: Haley

## 2022-05-18 ENCOUNTER — NON-APPOINTMENT (OUTPATIENT)
Age: 32
End: 2022-05-18

## 2022-05-27 NOTE — OB RN DELIVERY SUMMARY - BABY A: APGAR 5 MIN COLOR, DELIVERY
RN reviewed discharge instructions with patient  Patient denied any questions in regards to discharge instructions  (1) body pink, extremities blue

## 2022-07-06 ENCOUNTER — NON-APPOINTMENT (OUTPATIENT)
Age: 32
End: 2022-07-06

## 2022-07-20 ENCOUNTER — OFFICE (OUTPATIENT)
Dept: URBAN - METROPOLITAN AREA CLINIC 12 | Facility: CLINIC | Age: 32
Setting detail: OPHTHALMOLOGY
End: 2022-07-20
Payer: COMMERCIAL

## 2022-07-20 DIAGNOSIS — H52.13: ICD-10-CM

## 2022-07-20 PROCEDURE — SCREF LASIK EVAL: Performed by: OPHTHALMOLOGY

## 2022-07-20 ASSESSMENT — REFRACTION_MANIFEST
OD_AXIS: 100
OS_SPHERE: -1.00
OS_VA1: 20/20
OD_VA1: 20/20
OD_CYLINDER: -0.25
OS_AXIS: 080
OS_CYLINDER: -0.75
OD_SPHERE: -1.00

## 2022-07-20 ASSESSMENT — REFRACTION_AUTOREFRACTION
OS_CYLINDER: -0.75
OD_CYLINDER: -0.25
OD_SPHERE: -1.00
OS_AXIS: 081
OS_SPHERE: -1.00
OD_AXIS: 100

## 2022-07-20 ASSESSMENT — TONOMETRY
OD_IOP_MMHG: 20
OS_IOP_MMHG: 18

## 2022-07-20 ASSESSMENT — AXIALLENGTH_DERIVED
OD_AL: 23.1657
OS_AL: 22.8651
OD_AL: 23.1657
OS_AL: 22.8651

## 2022-07-20 ASSESSMENT — VISUAL ACUITY
OD_BCVA: 20/20-2
OS_BCVA: 20/20

## 2022-07-20 ASSESSMENT — KERATOMETRY
OS_K1POWER_DIOPTERS: 47.00
OS_K2POWER_DIOPTERS: 47.00
OD_AXISANGLE_DEGREES: 043
OS_AXISANGLE_DEGREES: 090
OD_K2POWER_DIOPTERS: 46.00
OD_K1POWER_DIOPTERS: 45.75

## 2022-07-20 ASSESSMENT — REFRACTION_CURRENTRX
OD_CYLINDER: SPH
OS_CYLINDER: -0.50
OD_AXIS: 0
OS_SPHERE: -1.25
OD_OVR_VA: 20/
OD_SPHERE: -1.50
OS_AXIS: 095
OS_VPRISM_DIRECTION: SV
OD_VPRISM_DIRECTION: SV
OS_OVR_VA: 20/

## 2022-07-20 ASSESSMENT — CONFRONTATIONAL VISUAL FIELD TEST (CVF)
OD_FINDINGS: FULL
OS_FINDINGS: FULL

## 2022-07-20 ASSESSMENT — SPHEQUIV_DERIVED
OD_SPHEQUIV: -1.125
OS_SPHEQUIV: -1.375
OD_SPHEQUIV: -1.125
OS_SPHEQUIV: -1.375

## 2022-08-23 ENCOUNTER — OFFICE (OUTPATIENT)
Dept: URBAN - METROPOLITAN AREA CLINIC 12 | Facility: CLINIC | Age: 32
Setting detail: OPHTHALMOLOGY
End: 2022-08-23
Payer: COMMERCIAL

## 2022-08-23 DIAGNOSIS — H52.13: ICD-10-CM

## 2022-08-23 PROBLEM — H10.413 CONJUNCTIVITIS, GIANT PAPILLARY CHRONIC; BOTH EYES: Status: RESOLVED | Noted: 2022-07-20 | Resolved: 2022-08-23

## 2022-08-23 PROCEDURE — N/C NO CHARGE: Performed by: OPHTHALMOLOGY

## 2022-08-23 PROCEDURE — SCREF LASIK EVAL: Performed by: OPHTHALMOLOGY

## 2022-08-23 ASSESSMENT — REFRACTION_AUTOREFRACTION
OS_CYLINDER: -0.75
OS_AXIS: 084
OS_SPHERE: -1.00
OD_AXIS: 122
OD_CYLINDER: -0.50
OD_SPHERE: -1.00

## 2022-08-23 ASSESSMENT — KERATOMETRY
OS_AXISANGLE_DEGREES: 090
OD_K2POWER_DIOPTERS: 46.50
OD_AXISANGLE_DEGREES: 064
OS_K1POWER_DIOPTERS: 47.00
OS_K2POWER_DIOPTERS: 47.00
OD_K1POWER_DIOPTERS: 46.00

## 2022-08-23 ASSESSMENT — REFRACTION_CURRENTRX
OS_AXIS: 083
OD_VPRISM_DIRECTION: SV
OD_OVR_VA: 20/
OS_OVR_VA: 20/
OD_SPHERE: -1.50
OD_AXIS: 0
OS_SPHERE: -1.25
OS_CYLINDER: -0.50
OD_CYLINDER: SPH
OS_VPRISM_DIRECTION: SV

## 2022-08-23 ASSESSMENT — VISUAL ACUITY
OD_BCVA: 20/20-2
OS_BCVA: 20/20

## 2022-08-23 ASSESSMENT — SPHEQUIV_DERIVED
OS_SPHEQUIV: -1.375
OD_SPHEQUIV: -1.25
OD_SPHEQUIV: -1.25
OS_SPHEQUIV: -1.125

## 2022-08-23 ASSESSMENT — AXIALLENGTH_DERIVED
OD_AL: 23.0802
OS_AL: 22.774
OS_AL: 22.8651
OD_AL: 23.0802

## 2022-08-23 ASSESSMENT — REFRACTION_MANIFEST
OD_CYLINDER: -0.50
OS_CYLINDER: -0.75
OS_AXIS: 085
OS_SPHERE: -0.75
OD_SPHERE: -1.00
OD_AXIS: 122
OS_VA1: 20/20-
OD_VA1: 20/20-

## 2022-08-23 ASSESSMENT — CONFRONTATIONAL VISUAL FIELD TEST (CVF)
OS_FINDINGS: FULL
OD_FINDINGS: FULL

## 2022-08-24 ENCOUNTER — RX ONLY (RX ONLY)
Age: 32
End: 2022-08-24

## 2022-08-24 ENCOUNTER — OTHER LOCATION (OUTPATIENT)
Dept: URBAN - METROPOLITAN AREA LASIK CENTER 6 | Facility: LASIK CENTER | Age: 32
Setting detail: OPHTHALMOLOGY
End: 2022-08-24

## 2022-08-24 DIAGNOSIS — H52.13: ICD-10-CM

## 2022-08-24 PROCEDURE — 65760 KERATOMILEUSIS: CPT | Performed by: OPHTHALMOLOGY

## 2022-08-24 ASSESSMENT — REFRACTION_CURRENTRX
OS_SPHERE: -1.25
OD_VPRISM_DIRECTION: SV
OS_AXIS: 083
OS_OVR_VA: 20/
OD_AXIS: 0
OD_OVR_VA: 20/
OS_CYLINDER: -0.50
OD_SPHERE: -1.50
OD_CYLINDER: SPH
OS_VPRISM_DIRECTION: SV

## 2022-08-24 ASSESSMENT — REFRACTION_AUTOREFRACTION
OS_CYLINDER: -0.75
OS_AXIS: 084
OS_SPHERE: -1.00
OD_SPHERE: -1.00
OD_AXIS: 122
OD_CYLINDER: -0.50

## 2022-08-24 ASSESSMENT — REFRACTION_MANIFEST
OD_CYLINDER: -0.50
OS_SPHERE: -0.75
OD_AXIS: 122
OD_SPHERE: -1.00
OS_VA1: 20/20-
OS_AXIS: 085
OD_VA1: 20/20-
OS_CYLINDER: -0.75

## 2022-08-24 ASSESSMENT — VISUAL ACUITY
OS_BCVA: 20/20
OD_BCVA: 20/20-2

## 2022-08-24 ASSESSMENT — CONFRONTATIONAL VISUAL FIELD TEST (CVF)
OS_FINDINGS: FULL
OD_FINDINGS: FULL

## 2022-08-24 ASSESSMENT — AXIALLENGTH_DERIVED
OS_AL: 22.774
OS_AL: 22.8651
OD_AL: 23.0802
OD_AL: 23.0802

## 2022-08-24 ASSESSMENT — KERATOMETRY
OS_K2POWER_DIOPTERS: 47.00
OS_AXISANGLE_DEGREES: 090
OD_K1POWER_DIOPTERS: 46.00
OD_K2POWER_DIOPTERS: 46.50
OD_AXISANGLE_DEGREES: 064
OS_K1POWER_DIOPTERS: 47.00

## 2022-08-24 ASSESSMENT — SPHEQUIV_DERIVED
OD_SPHEQUIV: -1.25
OS_SPHEQUIV: -1.125
OS_SPHEQUIV: -1.375
OD_SPHEQUIV: -1.25

## 2022-08-25 ENCOUNTER — OFFICE (OUTPATIENT)
Dept: URBAN - METROPOLITAN AREA CLINIC 12 | Facility: CLINIC | Age: 32
Setting detail: OPHTHALMOLOGY
End: 2022-08-25
Payer: COMMERCIAL

## 2022-08-25 DIAGNOSIS — H52.13: ICD-10-CM

## 2022-08-25 PROCEDURE — 99024 POSTOP FOLLOW-UP VISIT: CPT | Performed by: OPHTHALMOLOGY

## 2022-08-25 ASSESSMENT — REFRACTION_CURRENTRX
OS_CYLINDER: -0.50
OD_VPRISM_DIRECTION: SV
OS_OVR_VA: 20/
OD_AXIS: 0
OD_CYLINDER: SPH
OS_SPHERE: -1.25
OD_SPHERE: -1.50
OD_OVR_VA: 20/
OS_VPRISM_DIRECTION: SV
OS_AXIS: 083

## 2022-08-25 ASSESSMENT — REFRACTION_AUTOREFRACTION
OD_CYLINDER: SPHERE
OS_CYLINDER: -0.25
OD_AXIS: 000
OS_SPHERE: +0.25
OS_AXIS: 041
OD_SPHERE: PLANO

## 2022-08-25 ASSESSMENT — KERATOMETRY
OS_AXISANGLE_DEGREES: 101
OS_K1POWER_DIOPTERS: 44.50
OD_K1POWER_DIOPTERS: 44.00
OD_K2POWER_DIOPTERS: 4.25
OD_AXISANGLE_DEGREES: 108
OS_K2POWER_DIOPTERS: 45.00

## 2022-08-25 ASSESSMENT — CONFRONTATIONAL VISUAL FIELD TEST (CVF)
OD_FINDINGS: FULL
OS_FINDINGS: FULL

## 2022-08-25 ASSESSMENT — VISUAL ACUITY
OS_BCVA: 20/20+2
OD_BCVA: 20/20-2

## 2022-08-25 ASSESSMENT — REFRACTION_MANIFEST
OS_SPHERE: -0.75
OD_VA1: 20/20-
OD_AXIS: 122
OS_VA1: 20/20-
OS_CYLINDER: -0.75
OS_AXIS: 085
OD_CYLINDER: -0.50
OD_SPHERE: -1.00

## 2022-08-25 ASSESSMENT — SPHEQUIV_DERIVED
OD_SPHEQUIV: -1.25
OS_SPHEQUIV: 0.125
OS_SPHEQUIV: -1.125

## 2022-08-25 ASSESSMENT — AXIALLENGTH_DERIVED
OS_AL: 23.5711
OS_AL: 23.0948
OD_AL: 34.81

## 2022-08-30 ASSESSMENT — REFRACTION_CURRENTRX
OD_AXIS: 0
OS_AXIS: 083
OS_SPHERE: -1.25
OD_CYLINDER: SPH
OS_VPRISM_DIRECTION: SV
OD_VPRISM_DIRECTION: SV
OS_OVR_VA: 20/
OD_OVR_VA: 20/
OS_CYLINDER: -0.50
OD_SPHERE: -1.50

## 2022-08-30 ASSESSMENT — KERATOMETRY
OD_AXISANGLE_DEGREES: 108
OS_AXISANGLE_DEGREES: 101
OS_K2POWER_DIOPTERS: 45.00
OD_K1POWER_DIOPTERS: 44.00
OD_K2POWER_DIOPTERS: 4.25
OS_K1POWER_DIOPTERS: 44.50

## 2022-08-30 ASSESSMENT — REFRACTION_MANIFEST
OD_CYLINDER: -0.50
OS_AXIS: 085
OD_AXIS: 122
OS_CYLINDER: -0.75
OD_SPHERE: -1.00
OS_VA1: 20/20-
OD_VA1: 20/20-
OS_SPHERE: -0.75

## 2022-08-30 ASSESSMENT — REFRACTION_AUTOREFRACTION
OD_SPHERE: PLANO
OS_CYLINDER: -0.25
OD_CYLINDER: SPHERE
OS_SPHERE: +0.25
OD_AXIS: 000
OS_AXIS: 041

## 2022-08-30 ASSESSMENT — AXIALLENGTH_DERIVED
OS_AL: 23.5711
OS_AL: 23.0948
OD_AL: 34.81

## 2022-08-30 ASSESSMENT — VISUAL ACUITY
OS_BCVA: 20/20+2
OD_BCVA: 20/20-2

## 2022-08-30 ASSESSMENT — SPHEQUIV_DERIVED
OS_SPHEQUIV: 0.125
OD_SPHEQUIV: -1.25
OS_SPHEQUIV: -1.125

## 2022-09-01 ENCOUNTER — OFFICE (OUTPATIENT)
Dept: URBAN - METROPOLITAN AREA CLINIC 12 | Facility: CLINIC | Age: 32
Setting detail: OPHTHALMOLOGY
End: 2022-09-01
Payer: COMMERCIAL

## 2022-09-01 DIAGNOSIS — H52.13: ICD-10-CM

## 2022-09-01 PROCEDURE — 99024 POSTOP FOLLOW-UP VISIT: CPT | Performed by: OPHTHALMOLOGY

## 2022-09-01 ASSESSMENT — REFRACTION_CURRENTRX
OS_SPHERE: -1.25
OS_VPRISM_DIRECTION: SV
OS_CYLINDER: -0.50
OS_OVR_VA: 20/
OD_AXIS: 0
OD_VPRISM_DIRECTION: SV
OS_AXIS: 083
OD_CYLINDER: SPH
OD_OVR_VA: 20/
OD_SPHERE: -1.50

## 2022-09-01 ASSESSMENT — TONOMETRY
OD_IOP_MMHG: 10
OS_IOP_MMHG: 11

## 2022-09-01 ASSESSMENT — VISUAL ACUITY
OD_BCVA: 20/20
OS_BCVA: 20/15

## 2022-09-01 ASSESSMENT — REFRACTION_AUTOREFRACTION
OD_AXIS: 069
OS_CYLINDER: -0.50
OD_SPHERE: +0.25
OS_SPHERE: +0.25
OS_AXIS: 015
OD_CYLINDER: -0.25

## 2022-09-01 ASSESSMENT — SPHEQUIV_DERIVED
OD_SPHEQUIV: -1.25
OS_SPHEQUIV: 0
OS_SPHEQUIV: -1.125
OD_SPHEQUIV: 0.125

## 2022-09-01 ASSESSMENT — KERATOMETRY
OD_K1POWER_DIOPTERS: 44.25
OS_K1POWER_DIOPTERS: 44.50
OD_K2POWER_DIOPTERS: 44.50
OD_AXISANGLE_DEGREES: 086
OS_K2POWER_DIOPTERS: 46.00
OS_AXISANGLE_DEGREES: 092

## 2022-09-01 ASSESSMENT — AXIALLENGTH_DERIVED
OS_AL: 22.9662
OS_AL: 23.3892
OD_AL: 23.2275
OD_AL: 23.7587

## 2022-09-01 ASSESSMENT — CONFRONTATIONAL VISUAL FIELD TEST (CVF)
OS_FINDINGS: FULL
OD_FINDINGS: FULL

## 2022-09-01 ASSESSMENT — REFRACTION_MANIFEST
OS_SPHERE: -0.75
OD_CYLINDER: -0.50
OD_AXIS: 122
OS_AXIS: 085
OS_VA1: 20/20-
OD_VA1: 20/20-
OD_SPHERE: -1.00
OS_CYLINDER: -0.75

## 2022-11-28 ENCOUNTER — OFFICE (OUTPATIENT)
Dept: URBAN - METROPOLITAN AREA CLINIC 12 | Facility: CLINIC | Age: 32
Setting detail: OPHTHALMOLOGY
End: 2022-11-28
Payer: COMMERCIAL

## 2022-11-28 DIAGNOSIS — H52.13: ICD-10-CM

## 2022-11-28 PROCEDURE — 99024 POSTOP FOLLOW-UP VISIT: CPT | Performed by: OPHTHALMOLOGY

## 2022-11-28 ASSESSMENT — REFRACTION_CURRENTRX
OD_OVR_VA: 20/
OS_SPHERE: -1.25
OD_CYLINDER: SPH
OS_OVR_VA: 20/
OS_VPRISM_DIRECTION: SV
OD_VPRISM_DIRECTION: SV
OS_CYLINDER: -0.50
OD_SPHERE: -1.50
OD_AXIS: 0
OS_AXIS: 083

## 2022-11-28 ASSESSMENT — REFRACTION_AUTOREFRACTION
OD_SPHERE: +0.25
OS_CYLINDER: -0.25
OD_AXIS: 000
OS_SPHERE: +0.50
OS_AXIS: 039
OD_CYLINDER: SPHERE

## 2022-11-28 ASSESSMENT — CONFRONTATIONAL VISUAL FIELD TEST (CVF)
OS_FINDINGS: FULL
OD_FINDINGS: FULL

## 2022-11-28 ASSESSMENT — VISUAL ACUITY
OD_BCVA: 20/20-2
OS_BCVA: 20/15

## 2022-11-28 ASSESSMENT — REFRACTION_MANIFEST
OD_CYLINDER: -0.50
OS_AXIS: 085
OD_AXIS: 122
OS_CYLINDER: -0.75
OS_VA1: 20/20-
OS_SPHERE: -0.75
OD_SPHERE: -1.00
OD_VA1: 20/20-

## 2022-11-28 ASSESSMENT — KERATOMETRY
OS_K1POWER_DIOPTERS: 44.75
OD_AXISANGLE_DEGREES: 103
OS_K2POWER_DIOPTERS: 45.25
OS_AXISANGLE_DEGREES: 089
OD_K1POWER_DIOPTERS: 44.00
OD_K2POWER_DIOPTERS: 44.50

## 2022-11-28 ASSESSMENT — TONOMETRY: OD_IOP_MMHG: 11

## 2022-11-28 ASSESSMENT — SPHEQUIV_DERIVED
OD_SPHEQUIV: -1.25
OS_SPHEQUIV: -1.125
OS_SPHEQUIV: 0.375

## 2022-11-28 ASSESSMENT — AXIALLENGTH_DERIVED
OS_AL: 22.9148
OS_AL: 23.4798
OD_AL: 23.8053

## 2022-12-12 ENCOUNTER — APPOINTMENT (OUTPATIENT)
Dept: ANTEPARTUM | Facility: CLINIC | Age: 32
End: 2022-12-12

## 2022-12-12 ENCOUNTER — APPOINTMENT (OUTPATIENT)
Dept: OBGYN | Facility: CLINIC | Age: 32
End: 2022-12-12

## 2022-12-13 ENCOUNTER — APPOINTMENT (OUTPATIENT)
Dept: OBGYN | Facility: CLINIC | Age: 32
End: 2022-12-13
Payer: COMMERCIAL

## 2022-12-13 ENCOUNTER — APPOINTMENT (OUTPATIENT)
Dept: ANTEPARTUM | Facility: CLINIC | Age: 32
End: 2022-12-13

## 2022-12-13 ENCOUNTER — ASOB RESULT (OUTPATIENT)
Age: 32
End: 2022-12-13

## 2022-12-13 VITALS
SYSTOLIC BLOOD PRESSURE: 112 MMHG | HEIGHT: 65 IN | BODY MASS INDEX: 20.16 KG/M2 | WEIGHT: 121 LBS | DIASTOLIC BLOOD PRESSURE: 67 MMHG

## 2022-12-13 DIAGNOSIS — Z01.419 ENCOUNTER FOR GYNECOLOGICAL EXAMINATION (GENERAL) (ROUTINE) W/OUT ABNORMAL FINDINGS: ICD-10-CM

## 2022-12-13 DIAGNOSIS — Z15.89 GENETIC SUSCEPTIBILITY TO OTHER DISEASE: ICD-10-CM

## 2022-12-13 DIAGNOSIS — Z12.4 ENCOUNTER FOR SCREENING FOR MALIGNANT NEOPLASM OF CERVIX: ICD-10-CM

## 2022-12-13 DIAGNOSIS — Z34.93 ENCOUNTER FOR SUPERVISION OF NORMAL PREGNANCY, UNSPECIFIED, THIRD TRIMESTER: ICD-10-CM

## 2022-12-13 DIAGNOSIS — Z32.01 ENCOUNTER FOR PREGNANCY TEST, RESULT POSITIVE: ICD-10-CM

## 2022-12-13 DIAGNOSIS — O35.8XX0 MATERNAL CARE FOR OTHER (SUSPECTED) FETAL ABNORMALITY AND DAMAGE, NOT APPLICABLE OR UNSPECIFIED: ICD-10-CM

## 2022-12-13 DIAGNOSIS — Q28.9 DISEASES OF THE CIRCULATORY SYSTEM COMPLICATING PREGNANCY, SECOND TRIMESTER: ICD-10-CM

## 2022-12-13 DIAGNOSIS — O99.412 DISEASES OF THE CIRCULATORY SYSTEM COMPLICATING PREGNANCY, SECOND TRIMESTER: ICD-10-CM

## 2022-12-13 DIAGNOSIS — R73.09 OTHER ABNORMAL GLUCOSE: ICD-10-CM

## 2022-12-13 LAB
BILIRUB UR QL STRIP: NORMAL
GLUCOSE UR-MCNC: NORMAL
HCG UR QL: 0.2 EU/DL
HCG UR QL: POSITIVE
HGB UR QL STRIP.AUTO: NORMAL
KETONES UR-MCNC: NORMAL
LEUKOCYTE ESTERASE UR QL STRIP: NORMAL
NITRITE UR QL STRIP: NORMAL
PH UR STRIP: 5
PROT UR STRIP-MCNC: NORMAL
QUALITY CONTROL: YES
SP GR UR STRIP: 1.03

## 2022-12-13 PROCEDURE — XXXXX: CPT | Mod: 1L

## 2022-12-13 PROCEDURE — 76817 TRANSVAGINAL US OBSTETRIC: CPT

## 2022-12-14 ENCOUNTER — NON-APPOINTMENT (OUTPATIENT)
Age: 32
End: 2022-12-14

## 2022-12-30 ENCOUNTER — NON-APPOINTMENT (OUTPATIENT)
Age: 32
End: 2022-12-30

## 2023-01-10 ENCOUNTER — APPOINTMENT (OUTPATIENT)
Dept: ANTEPARTUM | Facility: CLINIC | Age: 33
End: 2023-01-10
Payer: COMMERCIAL

## 2023-01-10 ENCOUNTER — ASOB RESULT (OUTPATIENT)
Age: 33
End: 2023-01-10

## 2023-01-10 ENCOUNTER — APPOINTMENT (OUTPATIENT)
Dept: OBGYN | Facility: CLINIC | Age: 33
End: 2023-01-10
Payer: COMMERCIAL

## 2023-01-10 VITALS
HEIGHT: 65 IN | BODY MASS INDEX: 20.67 KG/M2 | DIASTOLIC BLOOD PRESSURE: 66 MMHG | SYSTOLIC BLOOD PRESSURE: 110 MMHG | WEIGHT: 124.06 LBS

## 2023-01-10 LAB
ABO + RH PNL BLD: NORMAL
B19V IGG SER QL IA: 6.64 INDEX
B19V IGG+IGM SER-IMP: NORMAL
B19V IGG+IGM SER-IMP: POSITIVE
B19V IGM FLD-ACNC: 0.09 INDEX
B19V IGM SER-ACNC: NEGATIVE
BASOPHILS # BLD AUTO: 0.04 K/UL
BASOPHILS NFR BLD AUTO: 0.5 %
BILIRUB UR QL STRIP: NORMAL
BLD GP AB SCN SERPL QL: NORMAL
CMV IGG SERPL QL: <0.2 U/ML
CMV IGG SERPL-IMP: NEGATIVE
CMV IGM SERPL QL: <8 AU/ML
CMV IGM SERPL QL: NEGATIVE
EOSINOPHIL # BLD AUTO: 0.04 K/UL
EOSINOPHIL NFR BLD AUTO: 0.5 %
ESTIMATED AVERAGE GLUCOSE: 108 MG/DL
GLUCOSE UR-MCNC: NORMAL
HBA1C MFR BLD HPLC: 5.4 %
HBV SURFACE AG SER QL: NONREACTIVE
HCG UR QL: 0.2 EU/DL
HCT VFR BLD CALC: 37.6 %
HGB BLD-MCNC: 12.5 G/DL
HGB UR QL STRIP.AUTO: NORMAL
HIV1+2 AB SPEC QL IA.RAPID: NONREACTIVE
IMM GRANULOCYTES NFR BLD AUTO: 0.3 %
KETONES UR-MCNC: NORMAL
LEUKOCYTE ESTERASE UR QL STRIP: NORMAL
LYMPHOCYTES # BLD AUTO: 1.78 K/UL
LYMPHOCYTES NFR BLD AUTO: 24.4 %
MAN DIFF?: NORMAL
MCHC RBC-ENTMCNC: 30 PG
MCHC RBC-ENTMCNC: 33.2 GM/DL
MCV RBC AUTO: 90.2 FL
MEV IGG FLD QL IA: 39 AU/ML
MEV IGG+IGM SER-IMP: POSITIVE
MONOCYTES # BLD AUTO: 0.43 K/UL
MONOCYTES NFR BLD AUTO: 5.9 %
NEUTROPHILS # BLD AUTO: 4.98 K/UL
NEUTROPHILS NFR BLD AUTO: 68.4 %
NITRITE UR QL STRIP: NORMAL
PH UR STRIP: 6
PLATELET # BLD AUTO: 302 K/UL
PROT UR STRIP-MCNC: NORMAL
RBC # BLD: 4.17 M/UL
RBC # FLD: 12.3 %
RUBV IGG FLD-ACNC: 1.7 INDEX
RUBV IGG SER-IMP: POSITIVE
SP GR UR STRIP: 1.01
T GONDII AB SER-IMP: NEGATIVE
T GONDII IGM SER QL: <3 AU/ML
T PALLIDUM AB SER QL IA: NEGATIVE
TSH SERPL-ACNC: 1.24 UIU/ML
WBC # FLD AUTO: 7.29 K/UL

## 2023-01-10 PROCEDURE — 36415 COLL VENOUS BLD VENIPUNCTURE: CPT

## 2023-01-10 PROCEDURE — 76801 OB US < 14 WKS SINGLE FETUS: CPT

## 2023-01-10 PROCEDURE — 0502F SUBSEQUENT PRENATAL CARE: CPT

## 2023-01-19 ENCOUNTER — NON-APPOINTMENT (OUTPATIENT)
Age: 33
End: 2023-01-19

## 2023-01-30 ENCOUNTER — NON-APPOINTMENT (OUTPATIENT)
Age: 33
End: 2023-01-30

## 2023-01-30 LAB
ADDITIONAL US: NORMAL
ALL CHROMOSOMES INTERPRETATION: NORMAL
ALL CHROMOSOMES TEST RESULT: NORMAL
CHROMOSOME13 INTERPRETATION: NORMAL
CHROMOSOME13 TEST RESULT: NORMAL
CHROMOSOME18 INTERPRETATION: NORMAL
CHROMOSOME18 TEST RESULT: NORMAL
CHROMOSOME21 INTERPRETATION: NORMAL
CHROMOSOME21 TEST RESULT: NORMAL
COMMENTS: AFP: NORMAL
CRL SCAN TWIN B: NORMAL
CRL SCAN: NORMAL
CROWN RUMP LENGTH TWIN B: NORMAL
CROWN RUMP LENGTH: 62.7 MM
DOWN SYNDROME AGE RISK: NORMAL
DOWN SYNDROME INTERPRETATION: NORMAL
DOWN SYNDROME SCREENING RISK: NORMAL
FETAL FRACTION: NORMAL
GEST. AGE ON COLLECTION DATE: 12.4 WEEKS
HCG MOM: 1.7
HCG VALUE: 191.4 IU/ML
MATERNAL AGE AT EDD: 31.7 YR
MICRODELETIONS INTERPRETATION: NORMAL
MICRODELETIONS RESULT: NORMAL
NOTE: AFP: NORMAL
NT MOM TWIN B: NORMAL
NT TWIN B: NORMAL
NUCHAL TRANSLUCENCY (NT): 1.3 MM
NUCHAL TRANSLUCENCY MOM: 0.97
NUMBER OF FETUSES: 1
PAPP-A MOM: 0.65
PAPP-A VALUE: 754.5 NG/ML
PERFORMANCE AND LIMITATIONS: NORMAL
RACE: NORMAL
RESULTS AFP: NORMAL
SEX CHROMOSOME INTERPRETATION: NORMAL
SEX CHROMOSOME TEST RESULT: NORMAL
SONOGRAPHER ID#: NORMAL
SUBMIT PART 2 SAMPLE USING: NORMAL
TEST RESULTS: AFP: NORMAL
TRISOMY 18 AGE RISK: NORMAL
TRISOMY 18 INTERPRETATION: NORMAL
TRISOMY 18 SCREENING RISK: NORMAL
VERIFI PRENATAL TEST: NOT DETECTED
WEIGHT AFP: 124 LBS

## 2023-02-03 ENCOUNTER — NON-APPOINTMENT (OUTPATIENT)
Age: 33
End: 2023-02-03

## 2023-02-07 ENCOUNTER — APPOINTMENT (OUTPATIENT)
Dept: OBGYN | Facility: CLINIC | Age: 33
End: 2023-02-07
Payer: COMMERCIAL

## 2023-02-07 VITALS
WEIGHT: 130 LBS | HEIGHT: 65 IN | DIASTOLIC BLOOD PRESSURE: 74 MMHG | BODY MASS INDEX: 21.66 KG/M2 | SYSTOLIC BLOOD PRESSURE: 122 MMHG

## 2023-02-07 LAB
BILIRUB UR QL STRIP: NORMAL
GLUCOSE UR-MCNC: NORMAL
HCG UR QL: 0.2 EU/DL
HGB UR QL STRIP.AUTO: NORMAL
KETONES UR-MCNC: NORMAL
LEUKOCYTE ESTERASE UR QL STRIP: NORMAL
NITRITE UR QL STRIP: NORMAL
PH UR STRIP: 6
PROT UR STRIP-MCNC: NORMAL
SP GR UR STRIP: 1

## 2023-02-07 PROCEDURE — 0502F SUBSEQUENT PRENATAL CARE: CPT

## 2023-02-08 ENCOUNTER — ASOB RESULT (OUTPATIENT)
Age: 33
End: 2023-02-08

## 2023-02-08 ENCOUNTER — APPOINTMENT (OUTPATIENT)
Dept: MATERNAL FETAL MEDICINE | Facility: CLINIC | Age: 33
End: 2023-02-08
Payer: COMMERCIAL

## 2023-02-08 PROCEDURE — 99202 OFFICE O/P NEW SF 15 MIN: CPT | Mod: 95

## 2023-02-21 ENCOUNTER — NON-APPOINTMENT (OUTPATIENT)
Age: 33
End: 2023-02-21

## 2023-03-07 ENCOUNTER — APPOINTMENT (OUTPATIENT)
Dept: ANTEPARTUM | Facility: CLINIC | Age: 33
End: 2023-03-07
Payer: COMMERCIAL

## 2023-03-07 ENCOUNTER — ASOB RESULT (OUTPATIENT)
Age: 33
End: 2023-03-07

## 2023-03-07 ENCOUNTER — APPOINTMENT (OUTPATIENT)
Dept: OBGYN | Facility: CLINIC | Age: 33
End: 2023-03-07
Payer: COMMERCIAL

## 2023-03-07 VITALS
HEIGHT: 65 IN | DIASTOLIC BLOOD PRESSURE: 61 MMHG | SYSTOLIC BLOOD PRESSURE: 103 MMHG | BODY MASS INDEX: 22.25 KG/M2 | WEIGHT: 133.56 LBS

## 2023-03-07 LAB
AFP MOM: 0.77
AFP VALUE: 31 NG/ML
ALPHA FETOPROTEIN SERUM COMMENT: NORMAL
ALPHA FETOPROTEIN SERUM INTERPRETATION: NORMAL
ALPHA FETOPROTEIN SERUM RESULTS: NORMAL
ALPHA FETOPROTEIN SERUM TEST RESULTS: NORMAL
GESTATIONAL AGE BASED ON: NORMAL
GESTATIONAL AGE ON COLLECTION DATE: 16.3 WEEKS
INSULIN DEP DIABETES: NO
MATERNAL AGE AT EDD AFP: 31.7 YR
MULTIPLE GESTATION: NO
OSBR RISK 1 IN: NORMAL
RACE: NORMAL
WEIGHT AFP: 130 LBS

## 2023-03-07 PROCEDURE — 76811 OB US DETAILED SNGL FETUS: CPT

## 2023-03-07 PROCEDURE — 0502F SUBSEQUENT PRENATAL CARE: CPT

## 2023-03-07 PROCEDURE — 76817 TRANSVAGINAL US OBSTETRIC: CPT | Mod: 59

## 2023-03-28 ENCOUNTER — APPOINTMENT (OUTPATIENT)
Dept: PEDIATRIC CARDIOLOGY | Facility: CLINIC | Age: 33
End: 2023-03-28
Payer: COMMERCIAL

## 2023-03-28 PROCEDURE — 76825 ECHO EXAM OF FETAL HEART: CPT

## 2023-03-28 PROCEDURE — 76820 UMBILICAL ARTERY ECHO: CPT

## 2023-03-28 PROCEDURE — 76821 MIDDLE CEREBRAL ARTERY ECHO: CPT

## 2023-03-28 PROCEDURE — 93325 DOPPLER ECHO COLOR FLOW MAPG: CPT | Mod: 59

## 2023-03-28 PROCEDURE — 76827 ECHO EXAM OF FETAL HEART: CPT

## 2023-03-28 PROCEDURE — 99214 OFFICE O/P EST MOD 30 MIN: CPT | Mod: 25

## 2023-03-28 PROCEDURE — 99204 OFFICE O/P NEW MOD 45 MIN: CPT | Mod: 25

## 2023-04-02 ENCOUNTER — NON-APPOINTMENT (OUTPATIENT)
Age: 33
End: 2023-04-02

## 2023-04-02 LAB
BILIRUB UR QL STRIP: NORMAL
GLUCOSE UR-MCNC: NORMAL
HCG UR QL: 0.2 EU/DL
HGB UR QL STRIP.AUTO: NORMAL
KETONES UR-MCNC: NORMAL
LEUKOCYTE ESTERASE UR QL STRIP: NORMAL
NITRITE UR QL STRIP: NORMAL
PH UR STRIP: 6
PROT UR STRIP-MCNC: NORMAL
SP GR UR STRIP: 1.02

## 2023-04-03 ENCOUNTER — NON-APPOINTMENT (OUTPATIENT)
Age: 33
End: 2023-04-03

## 2023-04-04 ENCOUNTER — APPOINTMENT (OUTPATIENT)
Dept: OBGYN | Facility: CLINIC | Age: 33
End: 2023-04-04
Payer: COMMERCIAL

## 2023-04-04 VITALS
HEIGHT: 65 IN | WEIGHT: 138.38 LBS | SYSTOLIC BLOOD PRESSURE: 100 MMHG | BODY MASS INDEX: 23.06 KG/M2 | DIASTOLIC BLOOD PRESSURE: 67 MMHG

## 2023-04-04 LAB
BILIRUB UR QL STRIP: NORMAL
GLUCOSE UR-MCNC: NORMAL
HCG UR QL: 0.2 EU/DL
HGB UR QL STRIP.AUTO: NORMAL
KETONES UR-MCNC: NORMAL
LEUKOCYTE ESTERASE UR QL STRIP: NORMAL
NITRITE UR QL STRIP: NORMAL
PH UR STRIP: 5.5
PROT UR STRIP-MCNC: NORMAL
SP GR UR STRIP: 1.03

## 2023-04-04 PROCEDURE — 0502F SUBSEQUENT PRENATAL CARE: CPT

## 2023-04-28 ENCOUNTER — NON-APPOINTMENT (OUTPATIENT)
Age: 33
End: 2023-04-28

## 2023-05-02 ENCOUNTER — APPOINTMENT (OUTPATIENT)
Dept: ANTEPARTUM | Facility: CLINIC | Age: 33
End: 2023-05-02
Payer: COMMERCIAL

## 2023-05-02 ENCOUNTER — ASOB RESULT (OUTPATIENT)
Age: 33
End: 2023-05-02

## 2023-05-02 ENCOUNTER — APPOINTMENT (OUTPATIENT)
Dept: OBGYN | Facility: CLINIC | Age: 33
End: 2023-05-02
Payer: COMMERCIAL

## 2023-05-02 VITALS
DIASTOLIC BLOOD PRESSURE: 69 MMHG | SYSTOLIC BLOOD PRESSURE: 109 MMHG | BODY MASS INDEX: 23.71 KG/M2 | HEIGHT: 65 IN | WEIGHT: 142.31 LBS

## 2023-05-02 PROCEDURE — 76817 TRANSVAGINAL US OBSTETRIC: CPT

## 2023-05-02 PROCEDURE — 76816 OB US FOLLOW-UP PER FETUS: CPT

## 2023-05-02 PROCEDURE — 0502F SUBSEQUENT PRENATAL CARE: CPT

## 2023-05-09 ENCOUNTER — APPOINTMENT (OUTPATIENT)
Dept: PEDIATRIC CARDIOLOGY | Facility: CLINIC | Age: 33
End: 2023-05-09
Payer: COMMERCIAL

## 2023-05-09 DIAGNOSIS — Z82.49 FAMILY HISTORY OF ISCHEMIC HEART DISEASE AND OTHER DISEASES OF THE CIRCULATORY SYSTEM: ICD-10-CM

## 2023-05-09 DIAGNOSIS — O35.2XX0 MATERNAL CARE FOR (SUSPECTED) HEREDITARY DISEASE IN FETUS, NOT APPLICABLE OR UNSPECIFIED: ICD-10-CM

## 2023-05-09 PROCEDURE — 76821 MIDDLE CEREBRAL ARTERY ECHO: CPT

## 2023-05-09 PROCEDURE — 76820 UMBILICAL ARTERY ECHO: CPT

## 2023-05-09 PROCEDURE — 76828 ECHO EXAM OF FETAL HEART: CPT

## 2023-05-09 PROCEDURE — 76826 ECHO EXAM OF FETAL HEART: CPT

## 2023-05-09 PROCEDURE — 99215 OFFICE O/P EST HI 40 MIN: CPT | Mod: 25

## 2023-05-09 PROCEDURE — 93325 DOPPLER ECHO COLOR FLOW MAPG: CPT | Mod: 59

## 2023-05-11 ENCOUNTER — NON-APPOINTMENT (OUTPATIENT)
Age: 33
End: 2023-05-11

## 2023-05-16 ENCOUNTER — APPOINTMENT (OUTPATIENT)
Dept: OBGYN | Facility: CLINIC | Age: 33
End: 2023-05-16
Payer: COMMERCIAL

## 2023-05-16 VITALS
HEIGHT: 65 IN | SYSTOLIC BLOOD PRESSURE: 105 MMHG | DIASTOLIC BLOOD PRESSURE: 68 MMHG | BODY MASS INDEX: 24.18 KG/M2 | WEIGHT: 145.13 LBS

## 2023-05-16 PROCEDURE — 0502F SUBSEQUENT PRENATAL CARE: CPT

## 2023-05-21 ENCOUNTER — TRANSCRIPTION ENCOUNTER (OUTPATIENT)
Age: 33
End: 2023-05-21

## 2023-05-21 LAB
BASOPHILS # BLD AUTO: 0.03 K/UL
BASOPHILS NFR BLD AUTO: 0.4 %
BILIRUB UR QL STRIP: NORMAL
BILIRUB UR QL STRIP: NORMAL
EOSINOPHIL # BLD AUTO: 0.06 K/UL
EOSINOPHIL NFR BLD AUTO: 0.7 %
GLUCOSE 1H P 50 G GLC PO SERPL-MCNC: 118 MG/DL
GLUCOSE BLDC GLUCOMTR-MCNC: 102
GLUCOSE UR-MCNC: NORMAL
GLUCOSE UR-MCNC: NORMAL
HCG UR QL: 0.2 EU/DL
HCG UR QL: 0.2 EU/DL
HCT VFR BLD CALC: 34.4 %
HGB BLD-MCNC: 11.3 G/DL
HGB UR QL STRIP.AUTO: NORMAL
HGB UR QL STRIP.AUTO: NORMAL
IMM GRANULOCYTES NFR BLD AUTO: 0.2 %
KETONES UR-MCNC: NORMAL
KETONES UR-MCNC: NORMAL
LEUKOCYTE ESTERASE UR QL STRIP: NORMAL
LEUKOCYTE ESTERASE UR QL STRIP: NORMAL
LYMPHOCYTES # BLD AUTO: 1.91 K/UL
LYMPHOCYTES NFR BLD AUTO: 23.2 %
MAN DIFF?: NORMAL
MCHC RBC-ENTMCNC: 30.5 PG
MCHC RBC-ENTMCNC: 32.8 GM/DL
MCV RBC AUTO: 93 FL
MONOCYTES # BLD AUTO: 0.55 K/UL
MONOCYTES NFR BLD AUTO: 6.7 %
NEUTROPHILS # BLD AUTO: 5.66 K/UL
NEUTROPHILS NFR BLD AUTO: 68.8 %
NITRITE UR QL STRIP: NORMAL
NITRITE UR QL STRIP: NORMAL
PH UR STRIP: 6
PH UR STRIP: 7
PLATELET # BLD AUTO: 285 K/UL
PROT UR STRIP-MCNC: NORMAL
PROT UR STRIP-MCNC: NORMAL
RBC # BLD: 3.7 M/UL
RBC # FLD: 12.9 %
SP GR UR STRIP: 1.02
SP GR UR STRIP: 1.03
WBC # FLD AUTO: 8.23 K/UL

## 2023-05-25 ENCOUNTER — NON-APPOINTMENT (OUTPATIENT)
Age: 33
End: 2023-05-25

## 2023-05-30 ENCOUNTER — APPOINTMENT (OUTPATIENT)
Dept: ANTEPARTUM | Facility: CLINIC | Age: 33
End: 2023-05-30
Payer: COMMERCIAL

## 2023-05-30 ENCOUNTER — ASOB RESULT (OUTPATIENT)
Age: 33
End: 2023-05-30

## 2023-05-30 ENCOUNTER — APPOINTMENT (OUTPATIENT)
Dept: OBGYN | Facility: CLINIC | Age: 33
End: 2023-05-30
Payer: COMMERCIAL

## 2023-05-30 VITALS
DIASTOLIC BLOOD PRESSURE: 64 MMHG | HEIGHT: 65 IN | SYSTOLIC BLOOD PRESSURE: 112 MMHG | WEIGHT: 147.19 LBS | BODY MASS INDEX: 24.52 KG/M2

## 2023-05-30 DIAGNOSIS — Z23 ENCOUNTER FOR IMMUNIZATION: ICD-10-CM

## 2023-05-30 PROCEDURE — 0502F SUBSEQUENT PRENATAL CARE: CPT

## 2023-05-30 PROCEDURE — 90715 TDAP VACCINE 7 YRS/> IM: CPT

## 2023-05-30 PROCEDURE — 76816 OB US FOLLOW-UP PER FETUS: CPT

## 2023-05-30 PROCEDURE — 90471 IMMUNIZATION ADMIN: CPT

## 2023-06-05 ENCOUNTER — NON-APPOINTMENT (OUTPATIENT)
Age: 33
End: 2023-06-05

## 2023-06-14 ENCOUNTER — APPOINTMENT (OUTPATIENT)
Dept: OBGYN | Facility: CLINIC | Age: 33
End: 2023-06-14
Payer: COMMERCIAL

## 2023-06-14 ENCOUNTER — NON-APPOINTMENT (OUTPATIENT)
Age: 33
End: 2023-06-14

## 2023-06-14 VITALS
SYSTOLIC BLOOD PRESSURE: 116 MMHG | WEIGHT: 148 LBS | BODY MASS INDEX: 24.66 KG/M2 | HEIGHT: 65 IN | DIASTOLIC BLOOD PRESSURE: 71 MMHG

## 2023-06-14 PROCEDURE — 0502F SUBSEQUENT PRENATAL CARE: CPT

## 2023-06-27 ENCOUNTER — APPOINTMENT (OUTPATIENT)
Dept: OBGYN | Facility: CLINIC | Age: 33
End: 2023-06-27
Payer: COMMERCIAL

## 2023-06-27 ENCOUNTER — APPOINTMENT (OUTPATIENT)
Dept: ANTEPARTUM | Facility: CLINIC | Age: 33
End: 2023-06-27
Payer: COMMERCIAL

## 2023-06-27 ENCOUNTER — ASOB RESULT (OUTPATIENT)
Age: 33
End: 2023-06-27

## 2023-06-27 VITALS
HEIGHT: 65 IN | WEIGHT: 152 LBS | DIASTOLIC BLOOD PRESSURE: 74 MMHG | SYSTOLIC BLOOD PRESSURE: 108 MMHG | BODY MASS INDEX: 25.33 KG/M2

## 2023-06-27 PROCEDURE — 36415 COLL VENOUS BLD VENIPUNCTURE: CPT

## 2023-06-27 PROCEDURE — 76816 OB US FOLLOW-UP PER FETUS: CPT

## 2023-06-27 PROCEDURE — 0502F SUBSEQUENT PRENATAL CARE: CPT

## 2023-06-30 LAB
B-HEM STREP SPEC QL CULT: ABNORMAL
BILIRUB UR QL STRIP: NORMAL
BILIRUB UR QL STRIP: NORMAL
GLUCOSE UR-MCNC: NORMAL
GLUCOSE UR-MCNC: NORMAL
HCG UR QL: 0.2 EU/DL
HCG UR QL: 0.2 EU/DL
HGB UR QL STRIP.AUTO: NORMAL
HGB UR QL STRIP.AUTO: NORMAL
HIV1+2 AB SPEC QL IA.RAPID: NONREACTIVE
KETONES UR-MCNC: NORMAL
KETONES UR-MCNC: NORMAL
LEUKOCYTE ESTERASE UR QL STRIP: ABNORMAL
LEUKOCYTE ESTERASE UR QL STRIP: NORMAL
NITRITE UR QL STRIP: NORMAL
NITRITE UR QL STRIP: NORMAL
PH UR STRIP: 7
PH UR STRIP: 7
PROT UR STRIP-MCNC: NORMAL
PROT UR STRIP-MCNC: NORMAL
SP GR UR STRIP: 1.01
SP GR UR STRIP: 1.01
T PALLIDUM AB SER QL IA: NEGATIVE

## 2023-07-05 ENCOUNTER — NON-APPOINTMENT (OUTPATIENT)
Age: 33
End: 2023-07-05

## 2023-07-05 ENCOUNTER — APPOINTMENT (OUTPATIENT)
Dept: OBGYN | Facility: CLINIC | Age: 33
End: 2023-07-05
Payer: COMMERCIAL

## 2023-07-05 VITALS
BODY MASS INDEX: 25.37 KG/M2 | WEIGHT: 152.25 LBS | SYSTOLIC BLOOD PRESSURE: 105 MMHG | HEIGHT: 65 IN | DIASTOLIC BLOOD PRESSURE: 64 MMHG

## 2023-07-05 LAB
BILIRUB UR QL STRIP: NORMAL
COLLECTION METHOD: NORMAL
GLUCOSE UR-MCNC: NORMAL
HCG UR QL: 0.2 EU/DL
HGB UR QL STRIP.AUTO: NORMAL
KETONES UR-MCNC: NORMAL
LEUKOCYTE ESTERASE UR QL STRIP: ABNORMAL
NITRITE UR QL STRIP: NORMAL
PH UR STRIP: 6
PROT UR STRIP-MCNC: NORMAL
SP GR UR STRIP: 1.01

## 2023-07-05 PROCEDURE — 0502F SUBSEQUENT PRENATAL CARE: CPT

## 2023-07-12 ENCOUNTER — NON-APPOINTMENT (OUTPATIENT)
Age: 33
End: 2023-07-12

## 2023-07-12 ENCOUNTER — APPOINTMENT (OUTPATIENT)
Dept: OBGYN | Facility: CLINIC | Age: 33
End: 2023-07-12
Payer: COMMERCIAL

## 2023-07-12 VITALS
BODY MASS INDEX: 25.33 KG/M2 | HEIGHT: 65 IN | SYSTOLIC BLOOD PRESSURE: 108 MMHG | DIASTOLIC BLOOD PRESSURE: 70 MMHG | WEIGHT: 152 LBS

## 2023-07-12 LAB
BILIRUB UR QL STRIP: NORMAL
GLUCOSE UR-MCNC: NORMAL
HCG UR QL: 0.2 EU/DL
HGB UR QL STRIP.AUTO: NORMAL
KETONES UR-MCNC: NORMAL
LEUKOCYTE ESTERASE UR QL STRIP: NORMAL
NITRITE UR QL STRIP: NORMAL
PH UR STRIP: 5.5
PROT UR STRIP-MCNC: NORMAL
SP GR UR STRIP: 1.01

## 2023-07-12 PROCEDURE — 0502F SUBSEQUENT PRENATAL CARE: CPT

## 2023-07-15 ENCOUNTER — NON-APPOINTMENT (OUTPATIENT)
Age: 33
End: 2023-07-15

## 2023-07-16 ENCOUNTER — INPATIENT (INPATIENT)
Facility: HOSPITAL | Age: 33
LOS: 1 days | Discharge: HOME | End: 2023-07-18
Attending: SPECIALIST | Admitting: SPECIALIST
Payer: COMMERCIAL

## 2023-07-16 VITALS — DIASTOLIC BLOOD PRESSURE: 68 MMHG | SYSTOLIC BLOOD PRESSURE: 114 MMHG | HEART RATE: 82 BPM

## 2023-07-16 DIAGNOSIS — O26.893 OTHER SPECIFIED PREGNANCY RELATED CONDITIONS, THIRD TRIMESTER: ICD-10-CM

## 2023-07-16 DIAGNOSIS — O26.899 OTHER SPECIFIED PREGNANCY RELATED CONDITIONS, UNSPECIFIED TRIMESTER: ICD-10-CM

## 2023-07-16 LAB
BASOPHILS # BLD AUTO: 0.03 K/UL — SIGNIFICANT CHANGE UP (ref 0–0.2)
BASOPHILS NFR BLD AUTO: 0.4 % — SIGNIFICANT CHANGE UP (ref 0–2)
BLD GP AB SCN SERPL QL: SIGNIFICANT CHANGE UP
EOSINOPHIL # BLD AUTO: 0.03 K/UL — SIGNIFICANT CHANGE UP (ref 0–0.5)
EOSINOPHIL NFR BLD AUTO: 0.4 % — SIGNIFICANT CHANGE UP (ref 0–6)
HCT VFR BLD CALC: 34 % — LOW (ref 34.5–45)
HGB BLD-MCNC: 11.6 G/DL — SIGNIFICANT CHANGE UP (ref 11.5–15.5)
IMM GRANULOCYTES NFR BLD AUTO: 0.4 % — SIGNIFICANT CHANGE UP (ref 0–0.9)
LYMPHOCYTES # BLD AUTO: 1.18 K/UL — SIGNIFICANT CHANGE UP (ref 1–3.3)
LYMPHOCYTES # BLD AUTO: 15.9 % — SIGNIFICANT CHANGE UP (ref 13–44)
MCHC RBC-ENTMCNC: 30.1 PG — SIGNIFICANT CHANGE UP (ref 27–34)
MCHC RBC-ENTMCNC: 34.1 GM/DL — SIGNIFICANT CHANGE UP (ref 32–36)
MCV RBC AUTO: 88.3 FL — SIGNIFICANT CHANGE UP (ref 80–100)
MONOCYTES # BLD AUTO: 0.45 K/UL — SIGNIFICANT CHANGE UP (ref 0–0.9)
MONOCYTES NFR BLD AUTO: 6.1 % — SIGNIFICANT CHANGE UP (ref 2–14)
NEUTROPHILS # BLD AUTO: 5.71 K/UL — SIGNIFICANT CHANGE UP (ref 1.8–7.4)
NEUTROPHILS NFR BLD AUTO: 76.8 % — SIGNIFICANT CHANGE UP (ref 43–77)
PLATELET # BLD AUTO: 223 K/UL — SIGNIFICANT CHANGE UP (ref 150–400)
RBC # BLD: 3.85 M/UL — SIGNIFICANT CHANGE UP (ref 3.8–5.2)
RBC # FLD: 13.7 % — SIGNIFICANT CHANGE UP (ref 10.3–14.5)
WBC # BLD: 7.43 K/UL — SIGNIFICANT CHANGE UP (ref 3.8–10.5)
WBC # FLD AUTO: 7.43 K/UL — SIGNIFICANT CHANGE UP (ref 3.8–10.5)

## 2023-07-16 PROCEDURE — 59409 OBSTETRICAL CARE: CPT

## 2023-07-16 RX ORDER — PRAMOXINE HYDROCHLORIDE 150 MG/15G
1 AEROSOL, FOAM RECTAL EVERY 4 HOURS
Refills: 0 | Status: DISCONTINUED | OUTPATIENT
Start: 2023-07-16 | End: 2023-07-18

## 2023-07-16 RX ORDER — ACETAMINOPHEN 500 MG
975 TABLET ORAL
Refills: 0 | Status: DISCONTINUED | OUTPATIENT
Start: 2023-07-16 | End: 2023-07-18

## 2023-07-16 RX ORDER — MAGNESIUM HYDROXIDE 400 MG/1
30 TABLET, CHEWABLE ORAL
Refills: 0 | Status: DISCONTINUED | OUTPATIENT
Start: 2023-07-16 | End: 2023-07-18

## 2023-07-16 RX ORDER — OXYCODONE HYDROCHLORIDE 5 MG/1
5 TABLET ORAL ONCE
Refills: 0 | Status: DISCONTINUED | OUTPATIENT
Start: 2023-07-16 | End: 2023-07-18

## 2023-07-16 RX ORDER — IBUPROFEN 200 MG
600 TABLET ORAL EVERY 6 HOURS
Refills: 0 | Status: DISCONTINUED | OUTPATIENT
Start: 2023-07-16 | End: 2023-07-18

## 2023-07-16 RX ORDER — SIMETHICONE 80 MG/1
80 TABLET, CHEWABLE ORAL EVERY 4 HOURS
Refills: 0 | Status: DISCONTINUED | OUTPATIENT
Start: 2023-07-16 | End: 2023-07-18

## 2023-07-16 RX ORDER — HYDROCORTISONE 1 %
1 OINTMENT (GRAM) TOPICAL EVERY 6 HOURS
Refills: 0 | Status: DISCONTINUED | OUTPATIENT
Start: 2023-07-16 | End: 2023-07-18

## 2023-07-16 RX ORDER — OXYTOCIN 10 UNIT/ML
41.67 VIAL (ML) INJECTION
Qty: 20 | Refills: 0 | Status: DISCONTINUED | OUTPATIENT
Start: 2023-07-16 | End: 2023-07-18

## 2023-07-16 RX ORDER — AMPICILLIN TRIHYDRATE 250 MG
2 CAPSULE ORAL ONCE
Refills: 0 | Status: COMPLETED | OUTPATIENT
Start: 2023-07-16 | End: 2023-07-16

## 2023-07-16 RX ORDER — BENZOCAINE 10 %
1 GEL (GRAM) MUCOUS MEMBRANE EVERY 6 HOURS
Refills: 0 | Status: DISCONTINUED | OUTPATIENT
Start: 2023-07-16 | End: 2023-07-18

## 2023-07-16 RX ORDER — OXYCODONE HYDROCHLORIDE 5 MG/1
5 TABLET ORAL
Refills: 0 | Status: DISCONTINUED | OUTPATIENT
Start: 2023-07-16 | End: 2023-07-18

## 2023-07-16 RX ORDER — SODIUM CHLORIDE 9 MG/ML
1000 INJECTION, SOLUTION INTRAVENOUS
Refills: 0 | Status: DISCONTINUED | OUTPATIENT
Start: 2023-07-16 | End: 2023-07-16

## 2023-07-16 RX ORDER — AMPICILLIN TRIHYDRATE 250 MG
1 CAPSULE ORAL EVERY 4 HOURS
Refills: 0 | Status: DISCONTINUED | OUTPATIENT
Start: 2023-07-16 | End: 2023-07-16

## 2023-07-16 RX ORDER — CITRIC ACID/SODIUM CITRATE 300-500 MG
30 SOLUTION, ORAL ORAL ONCE
Refills: 0 | Status: DISCONTINUED | OUTPATIENT
Start: 2023-07-16 | End: 2023-07-16

## 2023-07-16 RX ORDER — AER TRAVELER 0.5 G/1
1 SOLUTION RECTAL; TOPICAL EVERY 4 HOURS
Refills: 0 | Status: DISCONTINUED | OUTPATIENT
Start: 2023-07-16 | End: 2023-07-18

## 2023-07-16 RX ORDER — DIPHENHYDRAMINE HCL 50 MG
25 CAPSULE ORAL EVERY 6 HOURS
Refills: 0 | Status: DISCONTINUED | OUTPATIENT
Start: 2023-07-16 | End: 2023-07-18

## 2023-07-16 RX ORDER — IBUPROFEN 200 MG
600 TABLET ORAL EVERY 6 HOURS
Refills: 0 | Status: COMPLETED | OUTPATIENT
Start: 2023-07-16 | End: 2024-06-13

## 2023-07-16 RX ORDER — LANOLIN
1 OINTMENT (GRAM) TOPICAL EVERY 6 HOURS
Refills: 0 | Status: DISCONTINUED | OUTPATIENT
Start: 2023-07-16 | End: 2023-07-18

## 2023-07-16 RX ORDER — KETOROLAC TROMETHAMINE 30 MG/ML
30 SYRINGE (ML) INJECTION ONCE
Refills: 0 | Status: DISCONTINUED | OUTPATIENT
Start: 2023-07-16 | End: 2023-07-16

## 2023-07-16 RX ORDER — DIBUCAINE 1 %
1 OINTMENT (GRAM) RECTAL EVERY 6 HOURS
Refills: 0 | Status: DISCONTINUED | OUTPATIENT
Start: 2023-07-16 | End: 2023-07-18

## 2023-07-16 RX ORDER — SODIUM CHLORIDE 9 MG/ML
3 INJECTION INTRAMUSCULAR; INTRAVENOUS; SUBCUTANEOUS EVERY 8 HOURS
Refills: 0 | Status: DISCONTINUED | OUTPATIENT
Start: 2023-07-16 | End: 2023-07-18

## 2023-07-16 RX ORDER — OXYTOCIN 10 UNIT/ML
333.33 VIAL (ML) INJECTION
Qty: 20 | Refills: 0 | Status: COMPLETED | OUTPATIENT
Start: 2023-07-16 | End: 2023-07-16

## 2023-07-16 RX ORDER — OXYTOCIN 10 UNIT/ML
VIAL (ML) INJECTION
Qty: 30 | Refills: 0 | Status: DISCONTINUED | OUTPATIENT
Start: 2023-07-16 | End: 2023-07-16

## 2023-07-16 RX ORDER — TETANUS TOXOID, REDUCED DIPHTHERIA TOXOID AND ACELLULAR PERTUSSIS VACCINE, ADSORBED 5; 2.5; 8; 8; 2.5 [IU]/.5ML; [IU]/.5ML; UG/.5ML; UG/.5ML; UG/.5ML
0.5 SUSPENSION INTRAMUSCULAR ONCE
Refills: 0 | Status: DISCONTINUED | OUTPATIENT
Start: 2023-07-16 | End: 2023-07-18

## 2023-07-16 RX ORDER — CHLORHEXIDINE GLUCONATE 213 G/1000ML
1 SOLUTION TOPICAL DAILY
Refills: 0 | Status: DISCONTINUED | OUTPATIENT
Start: 2023-07-16 | End: 2023-07-16

## 2023-07-16 RX ADMIN — Medication 30 MILLIGRAM(S): at 21:04

## 2023-07-16 RX ADMIN — Medication 975 MILLIGRAM(S): at 23:54

## 2023-07-16 RX ADMIN — Medication 2 MILLIUNIT(S)/MIN: at 16:55

## 2023-07-16 RX ADMIN — Medication 1000 MILLIUNIT(S)/MIN: at 20:09

## 2023-07-16 RX ADMIN — SODIUM CHLORIDE 3 MILLILITER(S): 9 INJECTION INTRAMUSCULAR; INTRAVENOUS; SUBCUTANEOUS at 23:18

## 2023-07-16 RX ADMIN — SODIUM CHLORIDE 125 MILLILITER(S): 9 INJECTION, SOLUTION INTRAVENOUS at 11:12

## 2023-07-16 RX ADMIN — Medication 108 GRAM(S): at 18:56

## 2023-07-16 RX ADMIN — Medication 108 GRAM(S): at 15:41

## 2023-07-16 RX ADMIN — Medication 30 MILLIGRAM(S): at 21:19

## 2023-07-16 RX ADMIN — Medication 200 GRAM(S): at 11:13

## 2023-07-16 NOTE — OB RN PATIENT PROFILE - FALL HARM RISK - UNIVERSAL INTERVENTIONS
Bed in lowest position, wheels locked, appropriate side rails in place/Call bell, personal items and telephone in reach/Instruct patient to call for assistance before getting out of bed or chair/Non-slip footwear when patient is out of bed/[place] to call system/Physically safe environment - no spills, clutter or unnecessary equipment/Purposeful Proactive Rounding/Room/bathroom lighting operational, light cord in reach

## 2023-07-16 NOTE — OB PROVIDER H&P - ASSESSMENT
32y  at 39 weeks GA who is admitted to L&D in labor.  -Admit to L&D  -Consent  -Admission labs  -IV fluids  -Fetus: Cat I tracing. Continuous toco and fetal monitoring.   -GBS: pos amp ordered  -Analgesia: desires epidural    Discussed with Dr. Irizarry

## 2023-07-16 NOTE — OB PROVIDER LABOR PROGRESS NOTE - ASSESSMENT
32 y.o.  at 39 weeks gestation admitted in labor, SROM with clear fluid, augmented with pitocin, epidural in place. Cat 1 FHT.    - continue pitocin augmentation  - continuous toco and fetal heart monitoring    D/w Dr. Duron
Reassuring tracing  Regular ctx  Continue expectant management

## 2023-07-16 NOTE — OB RN DELIVERY SUMMARY - NS_SEPSISRSKCALC_OBGYN_ALL_OB_FT
EOS calculated successfully. EOS Risk Factor: 0.5/1000 live births ([facility] national incidence); GA=39w;Temp=98.24; ROM=11.65; GBS='Positive'; Antibiotics='No antibiotics or any antibiotics < 2 hrs prior to birth'

## 2023-07-16 NOTE — OB PROVIDER DELIVERY SUMMARY - NSPROVIDERDELIVERYNOTE_OBGYN_ALL_OB_FT
Patient felt rectal pressure and was found to be fully dilated, 0 station. She pushed effectively for 10 minutes, and delivered a viable male infant, "Lyle Graham," at 2009. Vertex delivered without difficulty, one nuchal cord noted and reduced, both shoulders then delivered without difficulty. Pitocin started. Delayed cord clamping for approximately 30 seconds, and skin to skin was initiated. Cord segment was clamped and cut for cord blood and gas collection. Placenta delivered spontaneously and intact at 2012. Uterus, cervix, perineum and vagina were inspected and a first degree laceration was noted and repaired with 2-0 vicryl. Excellent hemostasis was achieved.   Apgars 9,9.

## 2023-07-16 NOTE — OB RN DELIVERY SUMMARY - NSSELHIDDEN_OBGYN_ALL_OB_FT
[NS_DeliveryAttending1_OBGYN_ALL_OB_FT:[id number]=],[NS_DeliveryAssist1_OBGYN_ALL_OB_FT:[id number]=],[NS_DeliveryRN_OBGYN_ALL_OB_FT:[id number]=]

## 2023-07-16 NOTE — OB PROVIDER DELIVERY SUMMARY - NSSELHIDDEN_OBGYN_ALL_OB_FT
[NS_DeliveryAttending1_OBGYN_ALL_OB_FT:[id number]=],[NS_DeliveryAssist1_OBGYN_ALL_OB_FT:[id number]=]

## 2023-07-16 NOTE — OB RN PATIENT PROFILE - NSICDXPASTMEDICALHX_GEN_ALL_CORE_FT
New problem.  Small lump and thickening on tip of middle digit of left hand.  Suspecting metal sliver as he was doing a home project with a metal.  Was trying to remove with a knife.  No pain no redness, no pus.  No fever.  This happened 2 weeks ago.  No worsening, however no improvement.  Dull pain reproduced with slight pressure.   PAST MEDICAL HISTORY:  No pertinent past medical history PAST MEDICAL HISTORY:  VSD (ventricular septal defect)

## 2023-07-16 NOTE — OB RN PATIENT PROFILE - PRO PRENATAL LABS ORI SOURCE HBSAG
Problem: Patient Care Overview  Goal: Plan of Care/Patient Progress Review  RN:  1.Pt will remain hemodynamically stable.  2.Pt will be free from infection.   Outcome: No Change  Neuro: A&Ox4, stroke code called this morning at 0800 when pt c/o blurry vision and weakness to L arm. Blurriness has resolved but L arm continue to be slightly weaker.   Cardiac: Afib, SR in the evening noted. VSS.   Respiratory: Sating 98% on RA.  GI/: Adequate urine output. BM X3. Please hold all bowel meds.  Diet/appetite: Tolerating consist cho diet. Eating well.  Activity:  Assist of 1-2, up to chair.  Pain: Denies pain..   Skin: MLI CDI, R leg dressings CDI, wound vac WNL CDI, no new deficits noted.  LDA's: PIV x2    Plan: Continue with POC. Notify primary team with changes.      Problem: Cardiac Surgery (Adult)  Goal: Signs and Symptoms of Listed Potential Problems Will be Absent, Minimized or Managed (Cardiac Surgery)  Signs and symptoms of listed potential problems will be absent, minimized or managed by discharge/transition of care (reference Cardiac Surgery (Adult) CPG).   Outcome: No Change   03/31/18 1833   Cardiac Surgery   Problems Assessed (Cardiac Surgery) all   Problems Present (Cardiac Surgery) cardiac complications;neurologic complications          SCM hard copy, drawn during this pregnancy

## 2023-07-16 NOTE — OB PROVIDER H&P - HISTORY OF PRESENT ILLNESS
32y  at 39 weeks GA who is admitted to L&D in labor. Patient also c/o mucoid discharge, uncertain if ROM. Patient denies vaginal bleeding. She endorses good fetal movement. Denies fevers, chills, nausea, vomiting, chest pain, SOB, dizziness and headache. No other complaints at this time.     Prenatal course uncomplicated.      POB:  ()  PGYN: -fibroids, -ovarian cysts, denies STD hx, denies abnormal PAPs   PMH: VSD  PSH: Denies  SH: Denies EtOH, tobacco and illicit drug use during this pregnancy  Meds: PNVs  Allergies: NKDA

## 2023-07-16 NOTE — OB PROVIDER H&P - NSHPPHYSICALEXAM_GEN_ALL_CORE
T(C): 36.8 (07-16-23 @ 10:46), Max: 36.8 (07-16-23 @ 10:46)  HR: 82 (07-16-23 @ 10:46) (82 - 82)  BP: 114/68 (07-16-23 @ 10:46) (114/68 - 114/68)  RR: 16 (07-16-23 @ 10:46) (16 - 16)    Gen: NAD, well-appearing, AAOx3   Abd: Soft, gravid  Ext: non-tender, non-edematous  SSE: no gross pooling noted, nitrazine positive  SVE: 3/50/-3  Bedside sono: VTX  FHT: baseline   [place]: T(C): 36.8 (07-16-23 @ 10:46), Max: 36.8 (07-16-23 @ 10:46)  HR: 82 (07-16-23 @ 10:46) (82 - 82)  BP: 114/68 (07-16-23 @ 10:46) (114/68 - 114/68)  RR: 16 (07-16-23 @ 10:46) (16 - 16)    Gen: NAD, well-appearing, AAOx3   Abd: Soft, gravid  Ext: non-tender, non-edematous  SSE: no gross pooling noted, nitrazine positive  SVE: 3/50/-3  Bedside sono: VTX  FHT: baseline 120, moderate variability, + acel, - decel  [place]: CTX q8min

## 2023-07-16 NOTE — OB PROVIDER H&P - ATTENDING COMMENTS
OB Attending Addendum    I agree with the documentation by the resident physician above.  In short, the patient is a 31 y/o  at 39 weeks gestational age who is in term labor after spontaneous rupture of membranes earlier this morning.  She denies vaginal bleeding. She endorses good fetal movement. She denies fevers, chills, nausea, vomiting, chest pain, SOB, dizziness, or headache. She is GBS positive and will be started on ampicillin for GBS prophylaxis.  She is interested in an epidural when she is further in labor.  Category 1 fetal heart rate tracing.  There is a history of a maternal VSD, fetus had a normal fetal echocardiogram this pregnancy.  All appropriate consent forms were signed with her and all of her questions were answered.    Etienne Irizarry MD OB Attending Addendum    I agree with the documentation by the resident physician above.  In short, the patient is a 33 y/o  at 39 weeks gestational age who is in term labor after spontaneous rupture of membranes earlier this morning.  She denies vaginal bleeding. She endorses good fetal movement. She denies fevers, chills, nausea, vomiting, chest pain, SOB, dizziness, or headache. She is GBS positive and will be started on ampicillin for GBS prophylaxis.  She is interested in an epidural when she is further in labor.  Category 1 fetal heart rate tracing.      There is a history of a maternal VSD.  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  It was recommended that a pediatric cardiology consultation occur within 4 weeks after delivery or sooner if there is an arrythmia detected of other clinical cardiac concerns.  A holter monitor is to be placed after hospital discharge per pediatric cardiology.    All appropriate consent forms were signed with her and all of her questions were answered.    Etienne Irizarry MD

## 2023-07-16 NOTE — CHART NOTE - NSCHARTNOTEFT_GEN_A_CORE
Pt seen at bedside after her epidural. Feeling well.    Cervical exam:  4/50/-1     bpm, mod variability + accels, no decels  [place]: q4-5mins    Reassuring tracing with regular contractions. Continue expectant management.

## 2023-07-17 ENCOUNTER — NON-APPOINTMENT (OUTPATIENT)
Age: 33
End: 2023-07-17

## 2023-07-17 LAB
HCT VFR BLD CALC: 31.6 % — LOW (ref 34.5–45)
HGB BLD-MCNC: 10.7 G/DL — LOW (ref 11.5–15.5)
T PALLIDUM AB TITR SER: NEGATIVE — SIGNIFICANT CHANGE UP

## 2023-07-17 RX ADMIN — Medication 600 MILLIGRAM(S): at 09:21

## 2023-07-17 RX ADMIN — Medication 975 MILLIGRAM(S): at 18:07

## 2023-07-17 RX ADMIN — Medication 975 MILLIGRAM(S): at 12:50

## 2023-07-17 RX ADMIN — Medication 975 MILLIGRAM(S): at 12:06

## 2023-07-17 RX ADMIN — Medication 600 MILLIGRAM(S): at 09:38

## 2023-07-17 RX ADMIN — Medication 975 MILLIGRAM(S): at 18:52

## 2023-07-17 RX ADMIN — Medication 600 MILLIGRAM(S): at 14:46

## 2023-07-17 RX ADMIN — Medication 600 MILLIGRAM(S): at 14:57

## 2023-07-17 RX ADMIN — Medication 975 MILLIGRAM(S): at 00:31

## 2023-07-17 RX ADMIN — Medication 600 MILLIGRAM(S): at 20:45

## 2023-07-17 RX ADMIN — Medication 975 MILLIGRAM(S): at 23:01

## 2023-07-17 RX ADMIN — Medication 975 MILLIGRAM(S): at 06:11

## 2023-07-17 RX ADMIN — Medication 1 TABLET(S): at 12:06

## 2023-07-17 RX ADMIN — Medication 600 MILLIGRAM(S): at 03:05

## 2023-07-18 ENCOUNTER — APPOINTMENT (OUTPATIENT)
Dept: OBGYN | Facility: CLINIC | Age: 33
End: 2023-07-18

## 2023-07-18 ENCOUNTER — TRANSCRIPTION ENCOUNTER (OUTPATIENT)
Age: 33
End: 2023-07-18

## 2023-07-18 VITALS
DIASTOLIC BLOOD PRESSURE: 70 MMHG | HEART RATE: 75 BPM | OXYGEN SATURATION: 97 % | SYSTOLIC BLOOD PRESSURE: 116 MMHG | RESPIRATION RATE: 16 BRPM | TEMPERATURE: 98 F

## 2023-07-18 PROCEDURE — 85018 HEMOGLOBIN: CPT

## 2023-07-18 PROCEDURE — 86900 BLOOD TYPING SEROLOGIC ABO: CPT

## 2023-07-18 PROCEDURE — 85014 HEMATOCRIT: CPT

## 2023-07-18 PROCEDURE — 36415 COLL VENOUS BLD VENIPUNCTURE: CPT

## 2023-07-18 PROCEDURE — 85025 COMPLETE CBC W/AUTO DIFF WBC: CPT

## 2023-07-18 PROCEDURE — 86901 BLOOD TYPING SEROLOGIC RH(D): CPT

## 2023-07-18 PROCEDURE — 86780 TREPONEMA PALLIDUM: CPT

## 2023-07-18 PROCEDURE — 86850 RBC ANTIBODY SCREEN: CPT

## 2023-07-18 RX ORDER — IBUPROFEN 200 MG
1 TABLET ORAL
Qty: 20 | Refills: 0
Start: 2023-07-18 | End: 2023-07-22

## 2023-07-18 RX ORDER — ACETAMINOPHEN 500 MG
3 TABLET ORAL
Qty: 60 | Refills: 0
Start: 2023-07-18 | End: 2023-07-22

## 2023-07-18 RX ADMIN — Medication 600 MILLIGRAM(S): at 10:00

## 2023-07-18 RX ADMIN — Medication 975 MILLIGRAM(S): at 11:34

## 2023-07-18 RX ADMIN — Medication 975 MILLIGRAM(S): at 05:54

## 2023-07-18 RX ADMIN — Medication 975 MILLIGRAM(S): at 11:51

## 2023-07-18 RX ADMIN — Medication 600 MILLIGRAM(S): at 09:22

## 2023-07-18 NOTE — DISCHARGE NOTE OB - NS MD DC FALL RISK RISK
For information on Fall & Injury Prevention, visit: [email or web address] OR  [email or web address] OR  https://www.cdc.gov/steadi/patient.html

## 2023-07-18 NOTE — PROGRESS NOTE ADULT - ASSESSMENT
A/P:  MEGAN PEREZ is a 32y  now PPD#1 s/p spontaneous vaginal delivery at 39 weeks gestation, uncomplicated.    -Vital signs stable  -Hgb: 11.6 -> AM labs pending   -Voiding, tolerating PO, bowel function nml   -Advance care as tolerated   -Continue routine postpartum care and education  -Healthy male infant at bedside, desires circumcision   -Dispo: Continue to monitor patient
A/P:  MEGAN PEREZ is a 32y  now PPD#2 s/p spontaneous vaginal delivery at 39 weeks gestation, uncomplicated.    -Vital signs stable  -Hgb: 11.6 -> 10.7  -Voiding, tolerating PO, bowel function nml   -Advance care as tolerated   -Continue routine postpartum care and education  -Healthy male infant at bedside, desires circumcision   -Dispo: Stable for discharge home today pending attending approval

## 2023-07-18 NOTE — DISCHARGE NOTE OB - SEVERE ABDOMINAL/VAGINAL AND/OR RECTAL PAIN
[facility] , [place] Office    Physical Therapy  Cancellation/No-show Note  Patient Name:  Fe Alexandra  :  1944   Date:  2/10/2021  Cancelled visits to date: 5  No-shows to date: 0    For today's appointment patient:  []  Cancelled  [x]  Rescheduled appointment  []  No-show     Reason given by patient:  []  Patient ill  []  Conflicting appointment   []  No transportation    []  Conflict with work  []  No reason given  [x]  Other:     Comments: Weather    Electronically signed by:  Yamileth Conway PT
Statement Selected

## 2023-07-18 NOTE — DISCHARGE NOTE OB - CARE PROVIDER_API CALL
Evaristo Blackman  Obstetrics and Gynecology  [address], [address]  [place] [address]  Phone: [phone]  Fax: [phone]  Established Patient  Follow Up Time:

## 2023-07-18 NOTE — DISCHARGE NOTE OB - HOSPITAL COURSE
She is a 33yo now  who presented at 39 GA in active labor and had a normal delivery. She had a normal postpartum course and was discharged home in stable condition. Upon discharge she was voiding, tolerating PO, and ambulating.

## 2023-07-18 NOTE — PROGRESS NOTE ADULT - ATTENDING COMMENTS
32y  now PPD#2 s/p spontaneous vaginal delivery at 39 weeks gestation. Pt reports feeling well. Pain controlled, ronak diet, + amb, + void, mild lochia  HR: 81 (2023 20:15) (81 - 88)  BP: 104/68 (2023 20:15) (104/68 - 109/70)  PPD#2 s/p VD - doing well   - pain control   - encourage amb  - diet as ronak  - plan d/c home and f/u in office for PP visit    Rachelle Mendes MD
Patient seen and examined by me.  Agree with resident note.  Pain well controlled.  Tolerating regular diet, no nausea or vomiting.  Breastfeeding.  Minimal lochia.  Ambulating.  She is voiding without difficulty. Denies HA, dizziness, CP, SOB, LE pain.  VSS.  Fundus firm.  Labs reviewed.  Continue current care.  Circumcision for male infant done today.

## 2023-07-18 NOTE — DISCHARGE NOTE OB - MEDICATION SUMMARY - MEDICATIONS TO TAKE
I will START or STAY ON the medications listed below when I get home from the hospital:    ibuprofen 600 mg oral tablet  -- 1 tab(s) by mouth every 6 hours as needed for  moderate pain  -- Indication: For pain    acetaminophen 325 mg oral tablet  -- 3 tab(s) by mouth every 6 hours as needed for  mild pain  -- Indication: For pain    Prenatal 1 oral capsule  -- 1 cap(s) by mouth once a day  -- Indication: For postpartum recovery

## 2023-07-18 NOTE — PROGRESS NOTE ADULT - SUBJECTIVE AND OBJECTIVE BOX
MEGAN PEREZ is a 32y  now PPD#1 s/p spontaneous vaginal delivery at 39 weeks gestation, uncomplicated.    S:    The patient has no complaints.  Pain controlled with current treatment regimen.   She is ambulating without difficulty and tolerating PO.   + flatus/-BM/+ voiding   She endorses appropriate lochia, which is decreasing.   She is breastfeeding without difficulty.   Denies HA, fever, dizziness, SOB    O:    T(C): 36.4 (23 @ 05:22), Max: 36.8 (23 @ 10:46)  HR: 71 (23 @ 05:22) (58 - 104)  BP: 107/70 (23 @ 05:22) (88/55 - 124/58)  RR: 17 (23 @ 05:22) (16 - 18)  SpO2: 97% (23 @ 05:22) (93% - 100%)    Gen: NAD, AOx3  Breast: Nontender, non-engorged   Abdomen:  Soft, non-tender, non-distended  Uterus:  Fundus firm below umbilicus  VE:  +Lochia  Ext:  Non-tender and non-edematous                          11.6   7.43  )-----------( 223      ( 2023 11:45 )             34.0               
MEGAN PEREZ is a 32y  now PPD#2 s/p spontaneous vaginal delivery at 39 weeks gestation, uncomplicated.    S:    The patient has no complaints.  Pain controlled with current treatment regimen.   She is ambulating without difficulty and tolerating PO.   + flatus/-BM/+ voiding   She endorses appropriate lochia, which is decreasing.   She is breastfeeding without difficulty.   Denies HA, fever, dizziness, SOB    O:    Vital Signs Last 24 Hrs  T(C): 36.6 (2023 20:15), Max: 36.6 (2023 20:15)  T(F): 97.8 (2023 20:15), Max: 97.8 (2023 20:15)  HR: 81 (2023 20:15) (81 - 88)  BP: 104/68 (2023 20:15) (104/68 - 109/70)  RR: 17 (2023 20:15) (16 - 17)  SpO2: 97% (2023 20:15) (97% - 97%)      Gen: NAD, AOx3  Breast: Nontender, non-engorged   Abdomen:  Soft, non-tender, non-distended  Uterus:  Fundus firm below umbilicus  VE:  +Lochia  Ext:  Non-tender and non-edematous                                   10.7   x     )-----------( x        ( 2023 06:50 )             31.6

## 2023-07-19 ENCOUNTER — NON-APPOINTMENT (OUTPATIENT)
Age: 33
End: 2023-07-19

## 2023-07-20 PROBLEM — Q21.0 VENTRICULAR SEPTAL DEFECT: Chronic | Status: ACTIVE | Noted: 2023-07-16

## 2023-08-28 ENCOUNTER — APPOINTMENT (OUTPATIENT)
Dept: OBGYN | Facility: CLINIC | Age: 33
End: 2023-08-28
Payer: COMMERCIAL

## 2023-08-28 VITALS
WEIGHT: 129.13 LBS | SYSTOLIC BLOOD PRESSURE: 102 MMHG | HEIGHT: 65 IN | BODY MASS INDEX: 21.51 KG/M2 | DIASTOLIC BLOOD PRESSURE: 62 MMHG

## 2023-08-28 DIAGNOSIS — O99.019 ANEMIA COMPLICATING PREGNANCY, UNSPECIFIED TRIMESTER: ICD-10-CM

## 2023-08-28 DIAGNOSIS — O20.9 HEMORRHAGE IN EARLY PREGNANCY, UNSPECIFIED: ICD-10-CM

## 2023-08-28 DIAGNOSIS — Z87.42 PERSONAL HISTORY OF OTHER DISEASES OF THE FEMALE GENITAL TRACT: ICD-10-CM

## 2023-08-28 DIAGNOSIS — N64.9 DISORDER OF BREAST, UNSPECIFIED: ICD-10-CM

## 2023-08-28 DIAGNOSIS — O36.8390 MATERNAL CARE FOR ABNORMALITIES OF THE FETAL HEART RATE OR RHYTHM, UNSPECIFIED TRIMESTER, NOT APPLICABLE OR UNSPECIFIED: ICD-10-CM

## 2023-08-28 DIAGNOSIS — Z87.59 PERSONAL HISTORY OF OTHER COMPLICATIONS OF PREGNANCY, CHILDBIRTH AND THE PUERPERIUM: ICD-10-CM

## 2023-08-28 DIAGNOSIS — O35.9XX0 MATERNAL CARE FOR (SUSPECTED) FETAL ABNORMALITY AND DAMAGE, UNSPECIFIED, NOT APPLICABLE OR UNSPECIFIED: ICD-10-CM

## 2023-08-28 PROCEDURE — 99395 PREV VISIT EST AGE 18-39: CPT

## 2023-08-28 RX ORDER — PRENATAL VIT NO.130/IRON/FOLIC 27MG-0.8MG
TABLET ORAL
Refills: 0 | Status: ACTIVE | COMMUNITY

## 2023-08-28 RX ORDER — FERROUS SULFATE TAB EC 324 MG (65 MG FE EQUIVALENT) 324 (65 FE) MG
324 (65 FE) TABLET DELAYED RESPONSE ORAL
Qty: 120 | Refills: 1 | Status: DISCONTINUED | COMMUNITY
Start: 2023-05-21 | End: 2023-08-28

## 2023-09-04 LAB
CYTOLOGY CVX/VAG DOC THIN PREP: NORMAL
HPV HIGH+LOW RISK DNA PNL CVX: NOT DETECTED

## 2023-09-04 NOTE — PHYSICAL EXAM
[Chaperone Present] : A chaperone was present in the examining room during all aspects of the physical examination [Appropriately responsive] : appropriately responsive [Alert] : alert [No Acute Distress] : no acute distress [Soft] : soft [Non-tender] : non-tender [No Mass] : no mass [Oriented x3] : oriented x3 [Examination Of The Breasts] : a normal appearance [No Masses] : no breast masses were palpable [Labia Majora] : normal [Labia Minora] : normal [Normal] : normal [Uterine Adnexae] : normal [FreeTextEntry1] : Nellie White CMA

## 2023-09-04 NOTE — REASON FOR VISIT
[Annual] : an annual visit. [FreeTextEntry2] : 6 week ppv.  23, Baby Boy "Lyle", 8lbs 4 oz, Breast Feeding

## 2023-09-04 NOTE — HISTORY OF PRESENT ILLNESS
[N] : Patient is not sexually active [Menarche Age: ____] : age at menarche was [unfilled] [Previously active] : previously active [PapSmeardate] : 02/15/22 [TextBox_31] : neg [HIVDate] : 06/27/23 [TextBox_53] : neg [GonorrheaDate] : 04/19/21 [TextBox_63] : neg [ChlamydiaDate] : 04/19/21 [TextBox_68] : neg [HPVDate] : 02/15/22 [TextBox_78] : neg [HepatitisBDate] : 12/13/22 [TextBox_83] : neg [LMPDate] : 10/16/22 [PGHxTotal] : 2 [[facility]] : 2 [[facility] : 2 [FreeTextEntry1] : 10/16/22

## 2023-09-04 NOTE — PLAN
THERAPY NOTE    Patient Active Problem List   Diagnosis     Depression         Duration: Met with patient on 12/13/19 for a total of 30 minutes.    Patient Goals: The patient identified their treatment goals as manage my depression and si IDEATION SYMPTOMS    Interventions used: Rapport building, validation, coping skills, psychoeducation    Patient progress: Patient still reports feeling of killing self and not safe to herself but optimistic that things will get better    Patient Response:  She reported to have woken this morning feeling anxious and depressed and could not identify triggers to her feelings. She reported that it might have been due to this being the longest stay she has been a way from her dad and older brother . She reported missing them a lot .  She also reported to have been hearing sounds and wondered what it had been or she was imaging. She indicated feeling depressed now maybe due to the lose of her mom whom she never really got to grieve and or did not know how to grieve her passing and hopes some day they can do family therapy with dad to help stop feeling sad.  Assessment or plan: To help patient process thoughts and feelings and the behavior that the thoughts and feelings trigger in her .   [FreeTextEntry1] : We discussed all the options for contraception and she will use natural family planning. She wants to resume all activities.  She is doing well and she will RTO in 1 year.   During this visit comprehensive counseling was given regarding the following concerns:   1 We discussed the need for proper nutrition, hydration and exercise.  This includes cardiovascular and pelvic floor exercise.   2 We discussed the importance of maintaining a proper vaccination schedule and we discussed the utility of the flu vaccine and TDaP, Shingles and Covid 19 with periodic boosters.   3 We discussed the impact of chronic sun exposure and the risk for skin disease as a result. The benefits of a total body scan by a Dermatologist were reviewed...   4 We discussed the need for certain supplements for most people which include vitamin D3 (2000 IU daily), calcium rich foods with limitation on calcium supplementation by tabular form to 600 mg daily.  As part of a bone health program, we recommend weightbearing exercises, and some limited sun exposure (10-15 minutes daily) to help convert vitamin D to its active form. 5) We discussed the importance of an eye exam with dilation and retinal and corneal examination.

## 2023-09-06 ENCOUNTER — RX ONLY (RX ONLY)
Age: 33
End: 2023-09-06

## 2023-09-06 ENCOUNTER — OFFICE (OUTPATIENT)
Dept: URBAN - METROPOLITAN AREA CLINIC 12 | Facility: CLINIC | Age: 33
Setting detail: OPHTHALMOLOGY
End: 2023-09-06
Payer: COMMERCIAL

## 2023-09-06 DIAGNOSIS — H01.001: ICD-10-CM

## 2023-09-06 DIAGNOSIS — H10.45: ICD-10-CM

## 2023-09-06 DIAGNOSIS — G43.109: ICD-10-CM

## 2023-09-06 DIAGNOSIS — H52.13: ICD-10-CM

## 2023-09-06 DIAGNOSIS — H01.004: ICD-10-CM

## 2023-09-06 DIAGNOSIS — H10.413: ICD-10-CM

## 2023-09-06 PROCEDURE — 99213 OFFICE O/P EST LOW 20 MIN: CPT | Performed by: OPHTHALMOLOGY

## 2023-09-06 ASSESSMENT — SPHEQUIV_DERIVED
OD_SPHEQUIV: 0.125
OS_SPHEQUIV: -1.125
OS_SPHEQUIV: 0.375
OD_SPHEQUIV: -1.25

## 2023-09-06 ASSESSMENT — KERATOMETRY
OD_K1POWER_DIOPTERS: 44.50
OS_AXISANGLE_DEGREES: 090
OS_K2POWER_DIOPTERS: 45.50
OS_K1POWER_DIOPTERS: 44.50
OD_K2POWER_DIOPTERS: 44.50
OD_AXISANGLE_DEGREES: 090

## 2023-09-06 ASSESSMENT — REFRACTION_AUTOREFRACTION
OD_SPHERE: +0.25
OD_AXIS: 073
OD_CYLINDER: -0.25
OS_AXIS: 045
OS_CYLINDER: -0.25
OS_SPHERE: +0.50

## 2023-09-06 ASSESSMENT — AXIALLENGTH_DERIVED
OD_AL: 23.1831
OS_AL: 23.4798
OD_AL: 23.7122
OS_AL: 22.9148

## 2023-09-06 ASSESSMENT — REFRACTION_CURRENTRX
OS_AXIS: 083
OD_SPHERE: -1.50
OS_OVR_VA: 20/
OD_CYLINDER: SPH
OS_SPHERE: -1.25
OS_CYLINDER: -0.50
OS_VPRISM_DIRECTION: SV
OD_AXIS: 0
OD_OVR_VA: 20/
OD_VPRISM_DIRECTION: SV

## 2023-09-06 ASSESSMENT — REFRACTION_MANIFEST
OD_VA1: 20/20-
OD_AXIS: 122
OD_SPHERE: -1.00
OS_AXIS: 085
OD_CYLINDER: -0.50
OS_CYLINDER: -0.75
OS_SPHERE: -0.75
OS_VA1: 20/20-

## 2023-09-06 ASSESSMENT — TONOMETRY
OS_IOP_MMHG: 16
OD_IOP_MMHG: 16

## 2023-09-06 ASSESSMENT — CONFRONTATIONAL VISUAL FIELD TEST (CVF)
OD_FINDINGS: FULL
OS_FINDINGS: FULL

## 2023-09-06 ASSESSMENT — LID EXAM ASSESSMENTS
OD_BLEPHARITIS: 1+
OS_BLEPHARITIS: 1+

## 2023-09-06 ASSESSMENT — VISUAL ACUITY
OD_BCVA: 20/15-2
OS_BCVA: 20/15

## 2024-02-07 NOTE — OB RN PATIENT PROFILE - FALL HARM RISK - CONCLUSION
02/07/24 1017   Encounter Summary   Encounter Overview/Reason  Spiritual/Emotional Needs   Service Provided For: Patient   Referral/Consult From: [email or web address]   Support System Children;Family members   Last Encounter  02/07/24   Complexity of Encounter Moderate   Begin Time 1007   End Time  1017   Total Time Calculated 10 min   Spiritual/Emotional needs   Type Spiritual Support   Assessment/Intervention/Outcome   Assessment Coping   Intervention Nurtured Hope;Prayer (assurance of)/[place];Sustaining Presence/Ministry of presence   Outcome Comfort     Assessment:  In my encounter with the 61 yr old patient, while rounding  the unit 8A,  I provided spiritual care to patient through conversation, I also came to assess the patient's spiritual needs present. The pt was admitted due to hemoperitoneum.     Interventions:  I provided prayer, emotional support and words of comfort.  provided a listening presence and encouraged pt to share their beliefs and how they support them during their hospitalization.     Outcomes:  The patient was encouraged and didn't share any further spiritual needs at this time.     Plan:  Chaplains will follow-up at a later time for assessment of any spiritual care needs present.     Universal Safety Interventions

## 2024-04-30 ENCOUNTER — ASOB RESULT (OUTPATIENT)
Age: 34
End: 2024-04-30

## 2024-04-30 ENCOUNTER — APPOINTMENT (OUTPATIENT)
Dept: OBGYN | Facility: CLINIC | Age: 34
End: 2024-04-30
Payer: COMMERCIAL

## 2024-04-30 ENCOUNTER — APPOINTMENT (OUTPATIENT)
Dept: ANTEPARTUM | Facility: CLINIC | Age: 34
End: 2024-04-30
Payer: COMMERCIAL

## 2024-04-30 VITALS
SYSTOLIC BLOOD PRESSURE: 109 MMHG | BODY MASS INDEX: 19.89 KG/M2 | WEIGHT: 119.38 LBS | DIASTOLIC BLOOD PRESSURE: 69 MMHG | HEIGHT: 65 IN

## 2024-04-30 DIAGNOSIS — Z3A.01 LESS THAN 8 WEEKS GESTATION OF PREGNANCY: ICD-10-CM

## 2024-04-30 PROCEDURE — 99213 OFFICE O/P EST LOW 20 MIN: CPT

## 2024-04-30 PROCEDURE — 36415 COLL VENOUS BLD VENIPUNCTURE: CPT

## 2024-04-30 PROCEDURE — 76817 TRANSVAGINAL US OBSTETRIC: CPT

## 2024-04-30 PROCEDURE — 81003 URINALYSIS AUTO W/O SCOPE: CPT | Mod: QW

## 2024-04-30 PROCEDURE — 81025 URINE PREGNANCY TEST: CPT

## 2024-05-28 ENCOUNTER — NON-APPOINTMENT (OUTPATIENT)
Age: 34
End: 2024-05-28

## 2024-05-28 ENCOUNTER — APPOINTMENT (OUTPATIENT)
Dept: OBGYN | Facility: CLINIC | Age: 34
End: 2024-05-28
Payer: COMMERCIAL

## 2024-05-28 VITALS — DIASTOLIC BLOOD PRESSURE: 70 MMHG | HEIGHT: 65 IN | SYSTOLIC BLOOD PRESSURE: 101 MMHG

## 2024-05-28 PROCEDURE — 0500F INITIAL PRENATAL CARE VISIT: CPT

## 2024-05-28 PROCEDURE — 36415 COLL VENOUS BLD VENIPUNCTURE: CPT

## 2024-05-28 PROCEDURE — 59426 ANTEPARTUM CARE ONLY: CPT

## 2024-05-29 ENCOUNTER — OFFICE (OUTPATIENT)
Dept: URBAN - METROPOLITAN AREA CLINIC 12 | Facility: CLINIC | Age: 34
Setting detail: OPHTHALMOLOGY
End: 2024-05-29
Payer: COMMERCIAL

## 2024-05-29 DIAGNOSIS — H10.413: ICD-10-CM

## 2024-05-29 PROCEDURE — 99213 OFFICE O/P EST LOW 20 MIN: CPT | Performed by: STUDENT IN AN ORGANIZED HEALTH CARE EDUCATION/TRAINING PROGRAM

## 2024-05-29 ASSESSMENT — LID EXAM ASSESSMENTS
OD_BLEPHARITIS: 1+
OS_BLEPHARITIS: 1+

## 2024-05-29 ASSESSMENT — CONFRONTATIONAL VISUAL FIELD TEST (CVF)
OD_FINDINGS: FULL
OS_FINDINGS: FULL

## 2024-06-02 NOTE — PHYSICAL EXAM
[Chaperone Present] : A chaperone was present in the examining room during all aspects of the physical examination [Appropriately responsive] : appropriately responsive [Alert] : alert [No Acute Distress] : no acute distress [Oriented x3] : oriented x3 [42982] : A chaperone was present during the pelvic exam. [FreeTextEntry2] : Tien Coto

## 2024-06-02 NOTE — END OF VISIT
[FreeTextEntry3] : I, Jessica Miller solely acted as a scribe for Dr. Evaristo Blackman on 05/28/2024 . All medical entries made by the scribe were at my, Dr. Blackman's, direction and personally dictated by me on 05/28/2024 . I have reviewed the chart and agree that the record accurately reflects my personal performance of the history, physical exam, assessment and plan. I have also personally directed, reviewed, and agreed with the chart.

## 2024-06-02 NOTE — DISCUSSION/SUMMARY
[FreeTextEntry1] : -Recommended to increase PO fluids. -We discussed that if she does have aura with her migraine, she may have to have scan of the brain. -NT scan in 1-3 weeks.  -AFP blood draw at next visit.  -NIPTS blood drawn today.   Ronel will return in four weeks or as needed. All questions and concerns were addressed.

## 2024-06-02 NOTE — HISTORY OF PRESENT ILLNESS
[FreeTextEntry1] : Ronel presents today for a follow up visit. She states all is well. However, she complaints of a migraine without aura and nausea.  Patient does not want to know gender.

## 2024-06-11 ENCOUNTER — APPOINTMENT (OUTPATIENT)
Dept: ANTEPARTUM | Facility: CLINIC | Age: 34
End: 2024-06-11
Payer: COMMERCIAL

## 2024-06-11 ENCOUNTER — ASOB RESULT (OUTPATIENT)
Age: 34
End: 2024-06-11

## 2024-06-11 PROCEDURE — 76813 OB US NUCHAL MEAS 1 GEST: CPT

## 2024-06-21 ENCOUNTER — OFFICE (OUTPATIENT)
Dept: URBAN - METROPOLITAN AREA CLINIC 12 | Facility: CLINIC | Age: 34
Setting detail: OPHTHALMOLOGY
End: 2024-06-21
Payer: COMMERCIAL

## 2024-06-21 DIAGNOSIS — H10.413: ICD-10-CM

## 2024-06-21 PROCEDURE — 99213 OFFICE O/P EST LOW 20 MIN: CPT | Performed by: STUDENT IN AN ORGANIZED HEALTH CARE EDUCATION/TRAINING PROGRAM

## 2024-06-21 ASSESSMENT — LID EXAM ASSESSMENTS
OS_BLEPHARITIS: 1+
OD_BLEPHARITIS: 1+

## 2024-06-21 ASSESSMENT — CONFRONTATIONAL VISUAL FIELD TEST (CVF)
OS_FINDINGS: FULL
OD_FINDINGS: FULL

## 2024-06-22 LAB
25(OH)D3 SERPL-MCNC: 35.9 NG/ML
ABO + RH PNL BLD: NORMAL
B19V IGG SER QL IA: 4.62 INDEX
B19V IGG+IGM SER-IMP: NORMAL
B19V IGG+IGM SER-IMP: POSITIVE
B19V IGM FLD-ACNC: 0.11 INDEX
B19V IGM SER-ACNC: NEGATIVE
BILIRUB UR QL STRIP: NORMAL
BILIRUB UR QL STRIP: NORMAL
BLD GP AB SCN SERPL QL: NORMAL
CMV IGG SERPL QL: <0.2 U/ML
CMV IGG SERPL-IMP: NEGATIVE
CMV IGM SERPL QL: <8 AU/ML
CMV IGM SERPL QL: NEGATIVE
ESTIMATED AVERAGE GLUCOSE: 111 MG/DL
GLUCOSE UR-MCNC: NORMAL
GLUCOSE UR-MCNC: NORMAL
HBA1C MFR BLD HPLC: 5.5 %
HBV SURFACE AG SER QL: NONREACTIVE
HCG UR QL: 0.2 EU/DL
HCG UR QL: 0.2 EU/DL
HCG UR QL: POSITIVE
HGB A MFR BLD: 96.7 %
HGB A2 MFR BLD: 2.7 %
HGB F MFR BLD: 0.6 %
HGB FRACT BLD-IMP: NORMAL
HGB UR QL STRIP.AUTO: NORMAL
HGB UR QL STRIP.AUTO: NORMAL
HIV1+2 AB SPEC QL IA.RAPID: NONREACTIVE
KETONES UR-MCNC: NORMAL
KETONES UR-MCNC: NORMAL
LEAD BLD-MCNC: <1 UG/DL
LEUKOCYTE ESTERASE UR QL STRIP: NORMAL
LEUKOCYTE ESTERASE UR QL STRIP: NORMAL
M TB IFN-G BLD-IMP: NEGATIVE
MEV IGG FLD QL IA: 33.5 AU/ML
MEV IGG+IGM SER-IMP: POSITIVE
NITRITE UR QL STRIP: NORMAL
NITRITE UR QL STRIP: NORMAL
PH UR STRIP: 5.5
PH UR STRIP: 5.5
PROGEST SERPL-MCNC: 20.5 NG/ML
PROT UR STRIP-MCNC: NORMAL
PROT UR STRIP-MCNC: NORMAL
QUALITY CONTROL: YES
QUANTIFERON TB PLUS MITOGEN MINUS NIL: 6.56 IU/ML
QUANTIFERON TB PLUS NIL: 0.02 IU/ML
QUANTIFERON TB PLUS TB1 MINUS NIL: 0 IU/ML
QUANTIFERON TB PLUS TB2 MINUS NIL: 0 IU/ML
RUBV IGG FLD-ACNC: 1.6 INDEX
RUBV IGG SER-IMP: POSITIVE
SP GR UR STRIP: 1.01
SP GR UR STRIP: 1.03
T GONDII AB SER-IMP: NEGATIVE
T GONDII IGM SER QL: <3 AU/ML
T PALLIDUM AB SER QL IA: NEGATIVE
TSH SERPL-ACNC: 1.55 UIU/ML

## 2024-06-22 NOTE — DISCUSSION/SUMMARY
[FreeTextEntry1] : -Confirmation of pregnancy today- LMP: 3/9/24. EDC by today: 12/14/24.  1) Pelvic sono today: GA 7wks 1 day.  bpm. CL 3.57 Cm.  Results were discussed.  2) OB counseling done. Continue taking PNV qd. She was oriented to the practice and delivery at [facility].  3) New prenatal labs drawn today.   Ronel will present in 4 weeks or as needed. All questions and concerns were addressed.

## 2024-06-22 NOTE — PHYSICAL EXAM
[Chaperone Present] : A chaperone was present in the examining room during all aspects of the physical examination [Appropriately responsive] : appropriately responsive [Alert] : alert [No Acute Distress] : no acute distress [Oriented x3] : oriented x3 [FreeTextEntry2] : Tien Coto

## 2024-06-22 NOTE — HISTORY OF PRESENT ILLNESS
[N] : Patient does not use contraception [Y] : Positive pregnancy history [Menarche Age: ____] : age at menarche was [unfilled] [Currently Active] : currently active [FreeTextEntry1] : Ronel presents today for a confirmation of pregnancy. She states all is well.  She was accompanied by her  and the baby.  [PapSmeardate] : 8/28/23 [TextBox_31] : NEG [HIVDate] : 6/27/23 [TextBox_53] : NEG [HPVDate] : 8/28/23 [TextBox_78] : NEG [HepatitisBDate] : 12/13/22 [TextBox_83] : NEG [PGHxTotal] : 3 [[facility]xFullTerm] : 2 [[facility]xLiving] : 2

## 2024-06-25 ENCOUNTER — APPOINTMENT (OUTPATIENT)
Dept: OBGYN | Facility: CLINIC | Age: 34
End: 2024-06-25
Payer: COMMERCIAL

## 2024-06-25 ENCOUNTER — NON-APPOINTMENT (OUTPATIENT)
Age: 34
End: 2024-06-25

## 2024-06-25 VITALS
BODY MASS INDEX: 20.72 KG/M2 | HEIGHT: 65 IN | WEIGHT: 124.38 LBS | DIASTOLIC BLOOD PRESSURE: 61 MMHG | SYSTOLIC BLOOD PRESSURE: 104 MMHG

## 2024-06-25 LAB
BILIRUB UR QL STRIP: NORMAL
GLUCOSE UR-MCNC: NORMAL
HCG UR QL: 0.2 EU/DL
HGB UR QL STRIP.AUTO: NORMAL
KETONES UR-MCNC: NORMAL
LEUKOCYTE ESTERASE UR QL STRIP: NORMAL
NITRITE UR QL STRIP: NORMAL
PH UR STRIP: 6
PROT UR STRIP-MCNC: NORMAL
SP GR UR STRIP: 1.01

## 2024-06-25 PROCEDURE — 0502F SUBSEQUENT PRENATAL CARE: CPT

## 2024-06-25 PROCEDURE — 36415 COLL VENOUS BLD VENIPUNCTURE: CPT

## 2024-06-26 LAB
AF-AFP DISCLAIMER: NORMAL
AF-AFP MOM: 0.69
AFP CONCENTRATION: 21.2 NG/ML
AFP INTERPRETATION: NORMAL
AFP MOM CUT-OFF: 2.5
AFP PERCENTILE: 12.2
AFP SCREENING RESULT: NORMAL
AFTER SCREENING RISK OPEN SPINA BIFIDA: NORMAL
BEFORE SCREENING RISK OPEN SPINA BIFIDA: NORMAL
CARBAMAZEPINE?: NORMAL
CURRENT SMOKER: NORMAL
ESTIMATED DUE DATE: NORMAL
EXTREME ANALYTE ALERT: NO
FAMILY HISTORY OPEN SPINA BIFIDA: NORMAL
GESTATIONAL  AGE: NORMAL
GESTATIONAL AGE METHOD: NORMAL
INSULIN DEPEND DIABETES: NORMAL
MATERNAL WGT: 124.6
MULTIPLE PREGNANCY STATUS: NORMAL
RACE/ETHNICITY: NORMAL
VALPROIC ACID?: NORMAL

## 2024-07-23 ENCOUNTER — APPOINTMENT (OUTPATIENT)
Dept: ANTEPARTUM | Facility: CLINIC | Age: 34
End: 2024-07-23
Payer: COMMERCIAL

## 2024-07-23 ENCOUNTER — APPOINTMENT (OUTPATIENT)
Dept: OBGYN | Facility: CLINIC | Age: 34
End: 2024-07-23

## 2024-07-23 ENCOUNTER — ASOB RESULT (OUTPATIENT)
Age: 34
End: 2024-07-23

## 2024-07-23 PROCEDURE — 76817 TRANSVAGINAL US OBSTETRIC: CPT

## 2024-07-23 PROCEDURE — 76811 OB US DETAILED SNGL FETUS: CPT

## 2024-07-25 ENCOUNTER — NON-APPOINTMENT (OUTPATIENT)
Age: 34
End: 2024-07-25

## 2024-07-26 ENCOUNTER — NON-APPOINTMENT (OUTPATIENT)
Age: 34
End: 2024-07-26

## 2024-07-30 ENCOUNTER — NON-APPOINTMENT (OUTPATIENT)
Age: 34
End: 2024-07-30

## 2024-07-31 ENCOUNTER — APPOINTMENT (OUTPATIENT)
Dept: OBGYN | Facility: CLINIC | Age: 34
End: 2024-07-31
Payer: COMMERCIAL

## 2024-07-31 ENCOUNTER — APPOINTMENT (OUTPATIENT)
Dept: ANTEPARTUM | Facility: CLINIC | Age: 34
End: 2024-07-31
Payer: COMMERCIAL

## 2024-07-31 ENCOUNTER — ASOB RESULT (OUTPATIENT)
Age: 34
End: 2024-07-31

## 2024-07-31 VITALS
WEIGHT: 133.5 LBS | SYSTOLIC BLOOD PRESSURE: 103 MMHG | DIASTOLIC BLOOD PRESSURE: 69 MMHG | BODY MASS INDEX: 22.24 KG/M2 | HEIGHT: 65 IN

## 2024-07-31 LAB
APPEARANCE: CLEAR
BILIRUBIN URINE: NEGATIVE
BLOOD URINE: NEGATIVE
COLOR: YELLOW
GLUCOSE QUALITATIVE U: NEGATIVE
KETONES URINE: NEGATIVE
LEUKOCYTE ESTERASE URINE: NEGATIVE
NITRITE URINE: NEGATIVE
PH URINE: 6
PROTEIN URINE: NEGATIVE
SPECIFIC GRAVITY URINE: 1.01
UROBILINOGEN URINE: 0.2 (ref 0.2–?)

## 2024-07-31 PROCEDURE — 0502F SUBSEQUENT PRENATAL CARE: CPT

## 2024-07-31 PROCEDURE — 76816 OB US FOLLOW-UP PER FETUS: CPT

## 2024-08-26 ENCOUNTER — NON-APPOINTMENT (OUTPATIENT)
Age: 34
End: 2024-08-26

## 2024-08-26 ENCOUNTER — APPOINTMENT (OUTPATIENT)
Dept: OBGYN | Facility: CLINIC | Age: 34
End: 2024-08-26
Payer: COMMERCIAL

## 2024-08-26 VITALS
BODY MASS INDEX: 22.99 KG/M2 | DIASTOLIC BLOOD PRESSURE: 65 MMHG | SYSTOLIC BLOOD PRESSURE: 99 MMHG | HEIGHT: 65 IN | WEIGHT: 138 LBS

## 2024-08-26 LAB
APPEARANCE: CLEAR
BILIRUBIN URINE: NEGATIVE
BLOOD URINE: NEGATIVE
COLOR: YELLOW
GLUCOSE QUALITATIVE U: NEGATIVE
KETONES URINE: NEGATIVE
LEUKOCYTE ESTERASE URINE: NEGATIVE
NITRITE URINE: NEGATIVE
PH URINE: 7
PROTEIN URINE: NEGATIVE
SPECIFIC GRAVITY URINE: 1.01
UROBILINOGEN URINE: 0.2 (ref 0.2–?)

## 2024-08-26 PROCEDURE — 0502F SUBSEQUENT PRENATAL CARE: CPT

## 2024-08-29 ENCOUNTER — APPOINTMENT (OUTPATIENT)
Dept: OBGYN | Facility: CLINIC | Age: 34
End: 2024-08-29

## 2024-09-10 ENCOUNTER — APPOINTMENT (OUTPATIENT)
Dept: PEDIATRIC CARDIOLOGY | Facility: CLINIC | Age: 34
End: 2024-09-10
Payer: COMMERCIAL

## 2024-09-10 DIAGNOSIS — O35.2XX0 MATERNAL CARE FOR (SUSPECTED) HEREDITARY DISEASE IN FETUS, NOT APPLICABLE OR UNSPECIFIED: ICD-10-CM

## 2024-09-10 DIAGNOSIS — Q21.0 VENTRICULAR SEPTAL DEFECT: ICD-10-CM

## 2024-09-10 DIAGNOSIS — Z82.49 FAMILY HISTORY OF ISCHEMIC HEART DISEASE AND OTHER DISEASES OF THE CIRCULATORY SYSTEM: ICD-10-CM

## 2024-09-10 PROCEDURE — 76825 ECHO EXAM OF FETAL HEART: CPT

## 2024-09-10 PROCEDURE — 99213 OFFICE O/P EST LOW 20 MIN: CPT

## 2024-09-10 PROCEDURE — 76821 MIDDLE CEREBRAL ARTERY ECHO: CPT

## 2024-09-10 PROCEDURE — 93325 DOPPLER ECHO COLOR FLOW MAPG: CPT | Mod: 59

## 2024-09-10 PROCEDURE — 76820 UMBILICAL ARTERY ECHO: CPT

## 2024-09-10 PROCEDURE — 76827 ECHO EXAM OF FETAL HEART: CPT

## 2024-09-23 ENCOUNTER — NON-APPOINTMENT (OUTPATIENT)
Age: 34
End: 2024-09-23

## 2024-09-24 ENCOUNTER — APPOINTMENT (OUTPATIENT)
Dept: OBGYN | Facility: CLINIC | Age: 34
End: 2024-09-24
Payer: COMMERCIAL

## 2024-09-24 ENCOUNTER — APPOINTMENT (OUTPATIENT)
Dept: ANTEPARTUM | Facility: CLINIC | Age: 34
End: 2024-09-24
Payer: COMMERCIAL

## 2024-09-24 ENCOUNTER — ASOB RESULT (OUTPATIENT)
Age: 34
End: 2024-09-24

## 2024-09-24 VITALS
WEIGHT: 138.56 LBS | HEIGHT: 65 IN | SYSTOLIC BLOOD PRESSURE: 90 MMHG | DIASTOLIC BLOOD PRESSURE: 57 MMHG | BODY MASS INDEX: 23.09 KG/M2

## 2024-09-24 LAB
APPEARANCE: CLEAR
BILIRUBIN URINE: NEGATIVE
BLOOD URINE: NEGATIVE
COLOR: YELLOW
GLUCOSE BLDC GLUCOMTR-MCNC: 75
GLUCOSE QUALITATIVE U: NEGATIVE
KETONES URINE: NEGATIVE
LEUKOCYTE ESTERASE URINE: ABNORMAL
NITRITE URINE: NEGATIVE
PH URINE: 7
PROTEIN URINE: NEGATIVE
SPECIFIC GRAVITY URINE: 1.01
UROBILINOGEN URINE: 0.2 (ref 0.2–?)

## 2024-09-24 PROCEDURE — 36415 COLL VENOUS BLD VENIPUNCTURE: CPT

## 2024-09-24 PROCEDURE — 76816 OB US FOLLOW-UP PER FETUS: CPT

## 2024-09-24 PROCEDURE — 0502F SUBSEQUENT PRENATAL CARE: CPT

## 2024-09-25 LAB
GLUCOSE 1H P 50 G GLC PO SERPL-MCNC: 175 MG/DL
HCT VFR BLD CALC: 35.7 %
HGB BLD-MCNC: 11.4 G/DL
MCHC RBC-ENTMCNC: 31 PG
MCHC RBC-ENTMCNC: 31.9 GM/DL
MCV RBC AUTO: 97 FL
PLATELET # BLD AUTO: 294 K/UL
RBC # BLD: 3.68 M/UL
RBC # FLD: 13.3 %
T PALLIDUM AB SER QL IA: NEGATIVE
WBC # FLD AUTO: 8.54 K/UL

## 2024-09-27 DIAGNOSIS — O99.810 ABNORMAL GLUCOSE COMPLICATING PREGNANCY: ICD-10-CM

## 2024-09-30 ENCOUNTER — NON-APPOINTMENT (OUTPATIENT)
Age: 34
End: 2024-09-30

## 2024-10-09 ENCOUNTER — APPOINTMENT (OUTPATIENT)
Dept: OBGYN | Facility: CLINIC | Age: 34
End: 2024-10-09

## 2024-10-09 VITALS
HEIGHT: 65 IN | DIASTOLIC BLOOD PRESSURE: 64 MMHG | SYSTOLIC BLOOD PRESSURE: 100 MMHG | WEIGHT: 142.56 LBS | BODY MASS INDEX: 23.75 KG/M2

## 2024-10-09 LAB
APPEARANCE: CLEAR
BILIRUBIN URINE: NEGATIVE
BLOOD URINE: NEGATIVE
COLOR: YELLOW
GLUCOSE QUALITATIVE U: NEGATIVE
KETONES URINE: NEGATIVE
LEUKOCYTE ESTERASE URINE: NEGATIVE
NITRITE URINE: NEGATIVE
PH URINE: 5.5
PROTEIN URINE: NEGATIVE
SPECIFIC GRAVITY URINE: >=1.03
UROBILINOGEN URINE: 0.2 (ref 0.2–?)

## 2024-10-09 PROCEDURE — 0502F SUBSEQUENT PRENATAL CARE: CPT

## 2024-10-23 ENCOUNTER — APPOINTMENT (OUTPATIENT)
Dept: OBGYN | Facility: CLINIC | Age: 34
End: 2024-10-23

## 2024-10-23 VITALS
HEIGHT: 65 IN | SYSTOLIC BLOOD PRESSURE: 96 MMHG | DIASTOLIC BLOOD PRESSURE: 65 MMHG | WEIGHT: 145.25 LBS | BODY MASS INDEX: 24.2 KG/M2

## 2024-10-23 PROCEDURE — 0502F SUBSEQUENT PRENATAL CARE: CPT

## 2024-10-23 PROCEDURE — 90715 TDAP VACCINE 7 YRS/> IM: CPT

## 2024-10-23 PROCEDURE — 90471 IMMUNIZATION ADMIN: CPT

## 2024-10-27 LAB
APPEARANCE: CLEAR
BILIRUBIN URINE: NEGATIVE
BLOOD URINE: NEGATIVE
COLOR: YELLOW
GLUCOSE QUALITATIVE U: NEGATIVE
KETONES URINE: NEGATIVE
LEUKOCYTE ESTERASE URINE: NEGATIVE
NITRITE URINE: NEGATIVE
PH URINE: 6.5
PROTEIN URINE: NEGATIVE
SPECIFIC GRAVITY URINE: >=1.03
UROBILINOGEN URINE: 0.2 (ref 0.2–?)

## 2024-11-06 ENCOUNTER — NON-APPOINTMENT (OUTPATIENT)
Age: 34
End: 2024-11-06

## 2024-11-07 ENCOUNTER — APPOINTMENT (OUTPATIENT)
Dept: ANTEPARTUM | Facility: CLINIC | Age: 34
End: 2024-11-07
Payer: COMMERCIAL

## 2024-11-07 ENCOUNTER — APPOINTMENT (OUTPATIENT)
Dept: OBGYN | Facility: CLINIC | Age: 34
End: 2024-11-07
Payer: COMMERCIAL

## 2024-11-07 ENCOUNTER — ASOB RESULT (OUTPATIENT)
Age: 34
End: 2024-11-07

## 2024-11-07 VITALS
WEIGHT: 145.38 LBS | DIASTOLIC BLOOD PRESSURE: 68 MMHG | HEIGHT: 65 IN | SYSTOLIC BLOOD PRESSURE: 104 MMHG | HEART RATE: 75 BPM | BODY MASS INDEX: 24.22 KG/M2

## 2024-11-07 LAB
APPEARANCE: CLEAR
BILIRUBIN URINE: NEGATIVE
BLOOD URINE: NEGATIVE
COLOR: YELLOW
GLUCOSE QUALITATIVE U: NEGATIVE
KETONES URINE: NEGATIVE
LEUKOCYTE ESTERASE URINE: NEGATIVE
NITRITE URINE: NEGATIVE
PH URINE: 7
PROTEIN URINE: NEGATIVE
SPECIFIC GRAVITY URINE: 1.02
UROBILINOGEN URINE: 0.2 (ref 0.2–?)

## 2024-11-07 PROCEDURE — 0502F SUBSEQUENT PRENATAL CARE: CPT

## 2024-11-07 PROCEDURE — 76816 OB US FOLLOW-UP PER FETUS: CPT

## 2024-11-07 PROCEDURE — 81003 URINALYSIS AUTO W/O SCOPE: CPT | Mod: NC,QW

## 2024-11-15 ENCOUNTER — NON-APPOINTMENT (OUTPATIENT)
Age: 34
End: 2024-11-15

## 2024-11-18 ENCOUNTER — APPOINTMENT (OUTPATIENT)
Dept: OBGYN | Facility: CLINIC | Age: 34
End: 2024-11-18
Payer: COMMERCIAL

## 2024-11-18 VITALS
DIASTOLIC BLOOD PRESSURE: 68 MMHG | BODY MASS INDEX: 24.47 KG/M2 | SYSTOLIC BLOOD PRESSURE: 104 MMHG | WEIGHT: 146.9 LBS | HEIGHT: 65 IN

## 2024-11-18 PROCEDURE — 0502F SUBSEQUENT PRENATAL CARE: CPT

## 2024-11-18 PROCEDURE — 36415 COLL VENOUS BLD VENIPUNCTURE: CPT

## 2024-11-20 ENCOUNTER — NON-APPOINTMENT (OUTPATIENT)
Age: 34
End: 2024-11-20

## 2024-11-21 ENCOUNTER — NON-APPOINTMENT (OUTPATIENT)
Age: 34
End: 2024-11-21

## 2024-11-21 LAB
HCV AB SER QL: NONREACTIVE
HCV S/CO RATIO: 0.1 S/CO
HIV1+2 AB SPEC QL IA.RAPID: NONREACTIVE
T PALLIDUM AB SER QL IA: NEGATIVE

## 2024-11-22 LAB — B-HEM STREP SPEC QL CULT: ABNORMAL

## 2024-11-25 ENCOUNTER — APPOINTMENT (OUTPATIENT)
Dept: OBGYN | Facility: CLINIC | Age: 34
End: 2024-11-25
Payer: COMMERCIAL

## 2024-11-25 VITALS
HEIGHT: 65 IN | SYSTOLIC BLOOD PRESSURE: 104 MMHG | BODY MASS INDEX: 24.68 KG/M2 | WEIGHT: 148.13 LBS | DIASTOLIC BLOOD PRESSURE: 69 MMHG

## 2024-11-25 PROCEDURE — 0502F SUBSEQUENT PRENATAL CARE: CPT

## 2024-12-03 ENCOUNTER — APPOINTMENT (OUTPATIENT)
Dept: OBGYN | Facility: CLINIC | Age: 34
End: 2024-12-03
Payer: COMMERCIAL

## 2024-12-03 ENCOUNTER — NON-APPOINTMENT (OUTPATIENT)
Age: 34
End: 2024-12-03

## 2024-12-03 VITALS
WEIGHT: 150 LBS | HEIGHT: 65 IN | BODY MASS INDEX: 24.99 KG/M2 | SYSTOLIC BLOOD PRESSURE: 106 MMHG | DIASTOLIC BLOOD PRESSURE: 70 MMHG

## 2024-12-03 LAB
APPEARANCE: CLEAR
APPEARANCE: CLEAR
BILIRUBIN URINE: NEGATIVE
BILIRUBIN URINE: NEGATIVE
BLOOD URINE: NEGATIVE
BLOOD URINE: NEGATIVE
COLOR: YELLOW
COLOR: YELLOW
GLUCOSE QUALITATIVE U: NEGATIVE
GLUCOSE QUALITATIVE U: NEGATIVE
KETONES URINE: NEGATIVE
KETONES URINE: NEGATIVE
LEUKOCYTE ESTERASE URINE: NEGATIVE
LEUKOCYTE ESTERASE URINE: NEGATIVE
NITRITE URINE: NEGATIVE
NITRITE URINE: NEGATIVE
PH URINE: 6.5
PH URINE: 7
PROTEIN URINE: ABNORMAL
PROTEIN URINE: NEGATIVE
SPECIFIC GRAVITY URINE: 1.01
SPECIFIC GRAVITY URINE: 1.02
UROBILINOGEN URINE: 0.2 (ref 0.2–?)
UROBILINOGEN URINE: 1 (ref 0.2–?)

## 2024-12-03 PROCEDURE — 0502F SUBSEQUENT PRENATAL CARE: CPT

## 2024-12-10 ENCOUNTER — APPOINTMENT (OUTPATIENT)
Dept: ANTEPARTUM | Facility: CLINIC | Age: 34
End: 2024-12-10
Payer: COMMERCIAL

## 2024-12-10 ENCOUNTER — ASOB RESULT (OUTPATIENT)
Age: 34
End: 2024-12-10

## 2024-12-10 ENCOUNTER — APPOINTMENT (OUTPATIENT)
Dept: OBGYN | Facility: CLINIC | Age: 34
End: 2024-12-10
Payer: COMMERCIAL

## 2024-12-10 ENCOUNTER — INPATIENT (INPATIENT)
Facility: HOSPITAL | Age: 34
LOS: 1 days | Discharge: HOME | End: 2024-12-12
Attending: OBSTETRICS & GYNECOLOGY | Admitting: OBSTETRICS & GYNECOLOGY
Payer: COMMERCIAL

## 2024-12-10 VITALS
DIASTOLIC BLOOD PRESSURE: 64 MMHG | RESPIRATION RATE: 20 BRPM | WEIGHT: 147.93 LBS | HEART RATE: 86 BPM | TEMPERATURE: 99 F | SYSTOLIC BLOOD PRESSURE: 116 MMHG | HEIGHT: 65 IN

## 2024-12-10 VITALS
SYSTOLIC BLOOD PRESSURE: 114 MMHG | DIASTOLIC BLOOD PRESSURE: 74 MMHG | HEIGHT: 65 IN | BODY MASS INDEX: 24.99 KG/M2 | WEIGHT: 150 LBS

## 2024-12-10 DIAGNOSIS — Z34.93 ENCOUNTER FOR SUPERVISION OF NORMAL PREGNANCY, UNSPECIFIED, THIRD TRIMESTER: ICD-10-CM

## 2024-12-10 DIAGNOSIS — R82.71 BACTERIURIA: ICD-10-CM

## 2024-12-10 DIAGNOSIS — O26.893 OTHER SPECIFIED PREGNANCY RELATED CONDITIONS, THIRD TRIMESTER: ICD-10-CM

## 2024-12-10 DIAGNOSIS — Z3A.39 39 WEEKS GESTATION OF PREGNANCY: ICD-10-CM

## 2024-12-10 LAB
APPEARANCE: CLEAR
BASOPHILS # BLD AUTO: 0.04 K/UL — SIGNIFICANT CHANGE UP (ref 0–0.2)
BASOPHILS NFR BLD AUTO: 0.5 % — SIGNIFICANT CHANGE UP (ref 0–2)
BILIRUBIN URINE: NEGATIVE
BLD GP AB SCN SERPL QL: SIGNIFICANT CHANGE UP
BLOOD URINE: NEGATIVE
COLOR: YELLOW
EOSINOPHIL # BLD AUTO: 0.03 K/UL — SIGNIFICANT CHANGE UP (ref 0–0.5)
EOSINOPHIL NFR BLD AUTO: 0.4 % — SIGNIFICANT CHANGE UP (ref 0–6)
GLUCOSE QUALITATIVE U: NEGATIVE
HCT VFR BLD CALC: 35.6 % — SIGNIFICANT CHANGE UP (ref 34.5–45)
HGB BLD-MCNC: 11.8 G/DL — SIGNIFICANT CHANGE UP (ref 11.5–15.5)
IMM GRANULOCYTES NFR BLD AUTO: 0.5 % — SIGNIFICANT CHANGE UP (ref 0–0.9)
KETONES URINE: NEGATIVE
LEUKOCYTE ESTERASE URINE: NEGATIVE
LYMPHOCYTES # BLD AUTO: 1.75 K/UL — SIGNIFICANT CHANGE UP (ref 1–3.3)
LYMPHOCYTES # BLD AUTO: 21.4 % — SIGNIFICANT CHANGE UP (ref 13–44)
MCHC RBC-ENTMCNC: 29.5 PG — SIGNIFICANT CHANGE UP (ref 27–34)
MCHC RBC-ENTMCNC: 33.1 G/DL — SIGNIFICANT CHANGE UP (ref 32–36)
MCV RBC AUTO: 89 FL — SIGNIFICANT CHANGE UP (ref 80–100)
MONOCYTES # BLD AUTO: 0.56 K/UL — SIGNIFICANT CHANGE UP (ref 0–0.9)
MONOCYTES NFR BLD AUTO: 6.9 % — SIGNIFICANT CHANGE UP (ref 2–14)
NEUTROPHILS # BLD AUTO: 5.74 K/UL — SIGNIFICANT CHANGE UP (ref 1.8–7.4)
NEUTROPHILS NFR BLD AUTO: 70.3 % — SIGNIFICANT CHANGE UP (ref 43–77)
NITRITE URINE: NEGATIVE
PH URINE: 6
PLATELET # BLD AUTO: 260 K/UL — SIGNIFICANT CHANGE UP (ref 150–400)
PROTEIN URINE: NEGATIVE
RBC # BLD: 4 M/UL — SIGNIFICANT CHANGE UP (ref 3.8–5.2)
RBC # FLD: 13.2 % — SIGNIFICANT CHANGE UP (ref 10.3–14.5)
SPECIFIC GRAVITY URINE: >=1.03
UROBILINOGEN URINE: 0.2 (ref 0.2–?)
WBC # BLD: 8.16 K/UL — SIGNIFICANT CHANGE UP (ref 3.8–10.5)
WBC # FLD AUTO: 8.16 K/UL — SIGNIFICANT CHANGE UP (ref 3.8–10.5)

## 2024-12-10 PROCEDURE — 59409 OBSTETRICAL CARE: CPT

## 2024-12-10 PROCEDURE — 76816 OB US FOLLOW-UP PER FETUS: CPT

## 2024-12-10 PROCEDURE — 0502F SUBSEQUENT PRENATAL CARE: CPT

## 2024-12-10 PROCEDURE — 76819 FETAL BIOPHYS PROFIL W/O NST: CPT

## 2024-12-10 RX ORDER — AMPICILLIN TRIHYDRATE 250 MG/5ML
2 SUSPENSION, RECONSTITUTED, ORAL (ML) ORAL ONCE
Refills: 0 | Status: COMPLETED | OUTPATIENT
Start: 2024-12-10 | End: 2024-12-10

## 2024-12-10 RX ORDER — 0.9 % SODIUM CHLORIDE 0.9 %
1000 INTRAVENOUS SOLUTION INTRAVENOUS
Refills: 0 | Status: DISCONTINUED | OUTPATIENT
Start: 2024-12-10 | End: 2024-12-11

## 2024-12-10 RX ORDER — AMPICILLIN TRIHYDRATE 250 MG/5ML
1 SUSPENSION, RECONSTITUTED, ORAL (ML) ORAL EVERY 4 HOURS
Refills: 0 | Status: DISCONTINUED | OUTPATIENT
Start: 2024-12-10 | End: 2024-12-11

## 2024-12-10 RX ORDER — TRISODIUM CITRATE DIHYDRATE AND CITRIC ACID MONOHYDRATE 500; 334 MG/5ML; MG/5ML
30 SOLUTION ORAL ONCE
Refills: 0 | Status: DISCONTINUED | OUTPATIENT
Start: 2024-12-10 | End: 2024-12-11

## 2024-12-10 RX ORDER — CHLORHEXIDINE GLUCONATE 1.2 MG/ML
1 RINSE ORAL DAILY
Refills: 0 | Status: DISCONTINUED | OUTPATIENT
Start: 2024-12-10 | End: 2024-12-11

## 2024-12-10 RX ADMIN — Medication 108 GRAM(S): at 22:42

## 2024-12-10 RX ADMIN — Medication 2 MILLIUNIT(S)/MIN: at 20:56

## 2024-12-10 RX ADMIN — Medication 125 MILLILITER(S): at 18:31

## 2024-12-10 RX ADMIN — Medication 200 GRAM(S): at 18:32

## 2024-12-10 NOTE — OB PROVIDER H&P - NSHPLABSRESULTS_GEN_ALL_CORE
CBC Full  -  ( 10 Dec 2024 18:27 )  WBC Count : 8.16 K/uL  RBC Count : 4.00 M/uL  Hemoglobin : 11.8 g/dL  Hematocrit : 35.6 %  Platelet Count - Automated : 260 K/uL  Mean Cell Volume : 89.0 fl  Mean Cell Hemoglobin : 29.5 pg  Mean Cell Hemoglobin Concentration : 33.1 g/dL  Auto Neutrophil # : 5.74 K/uL  Auto Lymphocyte # : 1.75 K/uL  Auto Monocyte # : 0.56 K/uL  Auto Eosinophil # : 0.03 K/uL  Auto Basophil # : 0.04 K/uL  Auto Neutrophil % : 70.3 %  Auto Lymphocyte % : 21.4 %  Auto Monocyte % : 6.9 %  Auto Eosinophil % : 0.4 %  Auto Basophil % : 0.5 %

## 2024-12-10 NOTE — OB PROVIDER H&P - HISTORY OF PRESENT ILLNESS
Ronel Lawlre is 35 yo  presenting to L&D for labor induction. Pt went to her OB this morning at 1030 where she was measured at 1cm dilated and 50% effaced. She has no leakage of fluid, VB. She has irregular contractions and endorses fetal movement. She denies fever/chills, headaches, blurry vision, chest pain, SOB, abdominal pain, dysuria, changes in bowel habits or extremity edema. No complications with current pregnancy course.     LMP: 3/9/2024  [place]: 12/15/2024    OBHx:  - 38w6d on 2021   G2 - 39w2d on 2023   GYNHx: -fibroids, -cysts, -STDs, -abnormal paps  PMHx: Migraines, Congenital VSD  PSHx: None  Meds: PNV  Allergies: NKDA  SHx: No EtOH, alc or smoking during this pregnancy. Feels safe at home. 33 yo  presenting to L&D for IOL. She was evaluated at her OB this morning and found to be 1cm dilated. Shed nice loss of fluid, vaginal bleeding, painful contractions and endorses good fetal movement. No other complaints at this time.    Pregnancy course significant for: maternal cardiac disorder (congenital VSD), large for dates (EFW 82nd, AC 91st%ile), short interval between pregnancies, family history of hypertrophic cardiomyopathy    LMP: 3/9/2024  [place]: 12/15/2024    OBHx: - 38w6d on 2021   G2- 39w2d on 2023   GYNHx: -fibroids, -cysts, -STDs, -abnormal paps  PMHx: Migraines, Congenital VSD, KCNQI mutation (per chart)  PSHx: None  Meds: PNV  Allergies: NKDA  SHx: No EtOH, alc or smoking during this pregnancy. Feels safe at home.    Sono: 12/10 vertex, posterior placentq  EFW: 12/10 3917 gm, 82%

## 2024-12-10 NOTE — OB PROVIDER H&P - NSLOWPPHRISK_OBGYN_A_OB
No previous uterine incision/[place] Pregnancy/Less than or equal to 4 previous vaginal births/No known bleeding disorder/No history of postpartum hemorrhage/No other PPH risks indicated

## 2024-12-10 NOTE — OB PROVIDER H&P - NSHPPHYSICALEXAM_GEN_ALL_CORE
VITALS:   Vital Signs Last 24 Hrs  T(C): 37 (10 Dec 2024 18:00), Max: 37.0 (10 Dec 2024 18:00)  T(F): 98.6 (10 Dec 2024 18:00), Max: 98.6 (10 Dec 2024 18:00)  HR: 86 (10 Dec 2024 18:01) (86 - 86)  BP: 116/64 (10 Dec 2024 18:01) (116/64 - 116/64)  BP(mean): --  RR: 20 (10 Dec 2024 18:00) (20 - 20)  SpO2: --    GENERAL: NAD, lying in bed comfortably  LUNGS: Unlabored respirations.  ABDOMEN: Soft, nontender, nondistended  EXTREMITIES: No clubbing, cyanosis, or edema  NERVOUS SYSTEM:  A&Ox3, no focal deficits   SKIN: No rashes or lesions  FHT: 110bpm, +accel, -decel  TOCO: +contractions Vital Signs Last 24 Hrs  T(C): 36.8 (10 Dec 2024 20:04), Max: 37.0 (10 Dec 2024 18:00)  T(F): 98.24 (10 Dec 2024 20:04), Max: 98.6 (10 Dec 2024 18:00)  HR: 86 (10 Dec 2024 18:01) (86 - 86)  BP: 116/64 (10 Dec 2024 18:01) (116/64 - 116/64)  RR: 20 (10 Dec 2024 18:00) (20 - 20)    GENERAL: NAD, lying in bed comfortably  LUNGS: Unlabored respirations.  ABDOMEN: Soft, nontender, nondistended  EXTREMITIES: No clubbing, cyanosis, or edema  NERVOUS SYSTEM:  A&Ox3, no focal deficits   FHT: 110bpm, +moderate variability, +accel, -decel  TOCO: q4-5 mins

## 2024-12-10 NOTE — OB PROVIDER H&P - ATTENDING COMMENTS
35 yo  presenting to L&D for IOL. She was evaluated at her OB this morning and found to be 1cm dilated. Shed nice loss of fluid, vaginal bleeding, painful contractions and endorses good fetal movement. No other complaints at this time.    Pregnancy course significant for: maternal cardiac disorder (congenital VSD), large for dates (EFW 82nd, AC 91st%ile), short interval between pregnancies, family history of hypertrophic cardiomyopathy    Sono: 12/10 vertex, posterior placentq  EFW: 12/10 3917 gm, 82%    FHT: 110bpm, +moderate variability, +accel, -decel  TOCO: q4-5 mins    33yo  at 39w2d admitted to L&D for IOL.    -Admit to L&D  -Consent  -Admission labs  -IV fluids  -Fetus: Cat I tracing. Continuous toco and fetal monitoring.   -GBS: positive, ampicillin ordered  -Analgesia: epi prn

## 2024-12-10 NOTE — OB PROVIDER H&P - ASSESSMENT
A/P: 35yo  at 39w2d admitted to L&D for IOL.    -Admit to L&D  -Consent  -Admission labs  -IV fluids  -Fetus: Cat I tracing. Continuous toco and fetal monitoring.   -GBS: positive, ampicillin ordered  -Analgesia: epi prn    Discussed with Dr. Colvin

## 2024-12-10 NOTE — OB RN PATIENT PROFILE - FALL HARM RISK - UNIVERSAL INTERVENTIONS
Bed in lowest position, wheels locked, appropriate side rails in place/Call bell, personal items and telephone in reach/Instruct patient to call for assistance before getting out of bed or chair/Non-slip footwear when patient is out of bed/[place] to call system/Physically safe environment - no spills, clutter or unnecessary equipment/Purposeful Proactive Rounding/Room/bathroom lighting operational, light cord in reach

## 2024-12-11 ENCOUNTER — TRANSCRIPTION ENCOUNTER (OUTPATIENT)
Age: 34
End: 2024-12-11

## 2024-12-11 LAB — T PALLIDUM AB TITR SER: NEGATIVE — SIGNIFICANT CHANGE UP

## 2024-12-11 RX ORDER — TETANUS TOXOID, REDUCED DIPHTHERIA TOXOID AND ACELLULAR PERTUSSIS VACCINE, ADSORBED 5; 2.5; 8; 8; 2.5 [IU]/.5ML; [IU]/.5ML; UG/.5ML; UG/.5ML; UG/.5ML
0.5 SUSPENSION INTRAMUSCULAR ONCE
Refills: 0 | Status: DISCONTINUED | OUTPATIENT
Start: 2024-12-11 | End: 2024-12-12

## 2024-12-11 RX ORDER — DIPHENHYDRAMINE HCL 25 MG
25 CAPSULE ORAL EVERY 6 HOURS
Refills: 0 | Status: DISCONTINUED | OUTPATIENT
Start: 2024-12-11 | End: 2024-12-12

## 2024-12-11 RX ORDER — PRAMOXINE HYDROCHLORIDE 1 MG/ML
1 LOTION TOPICAL EVERY 4 HOURS
Refills: 0 | Status: DISCONTINUED | OUTPATIENT
Start: 2024-12-11 | End: 2024-12-12

## 2024-12-11 RX ORDER — OXYCODONE HYDROCHLORIDE 30 MG/1
5 TABLET ORAL
Refills: 0 | Status: DISCONTINUED | OUTPATIENT
Start: 2024-12-11 | End: 2024-12-12

## 2024-12-11 RX ORDER — ACETAMINOPHEN 500MG 500 MG/1
975 TABLET, COATED ORAL
Refills: 0 | Status: DISCONTINUED | OUTPATIENT
Start: 2024-12-11 | End: 2024-12-12

## 2024-12-11 RX ORDER — WITCH HAZEL 50 G/100ML
1 SOLUTION RECTAL EVERY 4 HOURS
Refills: 0 | Status: DISCONTINUED | OUTPATIENT
Start: 2024-12-11 | End: 2024-12-12

## 2024-12-11 RX ORDER — HYDROCORTISONE BUTYRATE 0.1 %
1 CREAM (GRAM) TOPICAL EVERY 6 HOURS
Refills: 0 | Status: DISCONTINUED | OUTPATIENT
Start: 2024-12-11 | End: 2024-12-12

## 2024-12-11 RX ORDER — SIMETHICONE 125 MG
80 CAPSULE ORAL EVERY 4 HOURS
Refills: 0 | Status: DISCONTINUED | OUTPATIENT
Start: 2024-12-11 | End: 2024-12-12

## 2024-12-11 RX ORDER — LANOLIN 72 %
1 OINTMENT (GRAM) TOPICAL EVERY 6 HOURS
Refills: 0 | Status: DISCONTINUED | OUTPATIENT
Start: 2024-12-11 | End: 2024-12-12

## 2024-12-11 RX ORDER — SODIUM CHLORIDE 9 MG/ML
3 INJECTION, SOLUTION INTRAMUSCULAR; INTRAVENOUS; SUBCUTANEOUS EVERY 8 HOURS
Refills: 0 | Status: DISCONTINUED | OUTPATIENT
Start: 2024-12-11 | End: 2024-12-12

## 2024-12-11 RX ORDER — .BETA.-CAROTENE, SODIUM ACETATE, ASCORBIC ACID, CHOLECALCIFEROL, .ALPHA.-TOCOPHEROL ACETATE, DL-, THIAMINE MONONITRATE, RIBOFLAVIN, NIACINAMIDE, PYRIDOXINE HYDROCHLORIDE, FOLIC ACID, CYANOCOBALAMIN, CALCIUM CARBONATE, FERROUS FUMARATE, ZINC OXIDE AND CUPRIC OXIDE 2000; 2000; 120; 400; 22; 1.84; 3; 20; 10; 1; 12; 200; 27; 25; 2 [IU]/1; [IU]/1; MG/1; [IU]/1; MG/1; MG/1; MG/1; MG/1; MG/1; MG/1; UG/1; MG/1; MG/1; MG/1; MG/1
1 TABLET ORAL DAILY
Refills: 0 | Status: DISCONTINUED | OUTPATIENT
Start: 2024-12-11 | End: 2024-12-12

## 2024-12-11 RX ORDER — IBUPROFEN 200 MG
600 TABLET ORAL EVERY 6 HOURS
Refills: 0 | Status: DISCONTINUED | OUTPATIENT
Start: 2024-12-11 | End: 2024-12-12

## 2024-12-11 RX ORDER — OXYCODONE HYDROCHLORIDE 30 MG/1
5 TABLET ORAL ONCE
Refills: 0 | Status: DISCONTINUED | OUTPATIENT
Start: 2024-12-11 | End: 2024-12-12

## 2024-12-11 RX ORDER — BENZOCAINE 10 %
1 OINTMENT (GRAM) TOPICAL EVERY 6 HOURS
Refills: 0 | Status: DISCONTINUED | OUTPATIENT
Start: 2024-12-11 | End: 2024-12-12

## 2024-12-11 RX ORDER — DIBUCAINE 1 %
1 OINTMENT (GRAM) TOPICAL EVERY 6 HOURS
Refills: 0 | Status: DISCONTINUED | OUTPATIENT
Start: 2024-12-11 | End: 2024-12-12

## 2024-12-11 RX ORDER — IBUPROFEN 200 MG
600 TABLET ORAL EVERY 6 HOURS
Refills: 0 | Status: COMPLETED | OUTPATIENT
Start: 2024-12-11 | End: 2025-11-09

## 2024-12-11 RX ORDER — KETOROLAC TROMETHAMINE 30 MG/ML
30 INJECTION INTRAMUSCULAR; INTRAVENOUS ONCE
Refills: 0 | Status: DISCONTINUED | OUTPATIENT
Start: 2024-12-11 | End: 2024-12-11

## 2024-12-11 RX ADMIN — Medication 125 MILLILITER(S): at 03:45

## 2024-12-11 RX ADMIN — Medication 125 MILLILITER(S): at 05:50

## 2024-12-11 RX ADMIN — KETOROLAC TROMETHAMINE 30 MILLIGRAM(S): 30 INJECTION INTRAMUSCULAR; INTRAVENOUS at 12:08

## 2024-12-11 RX ADMIN — Medication 125 MILLILITER(S): at 03:00

## 2024-12-11 RX ADMIN — KETOROLAC TROMETHAMINE 30 MILLIGRAM(S): 30 INJECTION INTRAMUSCULAR; INTRAVENOUS at 11:38

## 2024-12-11 RX ADMIN — Medication 108 GRAM(S): at 06:32

## 2024-12-11 RX ADMIN — Medication 108 GRAM(S): at 02:31

## 2024-12-11 RX ADMIN — SODIUM CHLORIDE 3 MILLILITER(S): 9 INJECTION, SOLUTION INTRAMUSCULAR; INTRAVENOUS; SUBCUTANEOUS at 16:00

## 2024-12-11 RX ADMIN — ACETAMINOPHEN 500MG 975 MILLIGRAM(S): 500 TABLET, COATED ORAL at 21:40

## 2024-12-11 RX ADMIN — Medication 167 MILLIUNIT(S)/MIN: at 10:00

## 2024-12-11 NOTE — OB PROVIDER LABOR PROGRESS NOTE - NS_SUBJECTIVE/OBJECTIVE_OBGYN_ALL_OB_FT
Vital Signs Last 24 Hrs  T(C): 36.8 (10 Dec 2024 20:04), Max: 37.0 (10 Dec 2024 18:00)  T(F): 98.24 (10 Dec 2024 20:04), Max: 98.6 (10 Dec 2024 18:00)  HR: 82 (11 Dec 2024 00:45) (62 - 87)  BP: 98/54 (11 Dec 2024 00:45) (88/55 - 117/58)  RR: 20 (10 Dec 2024 18:00) (20 - 20)

## 2024-12-11 NOTE — DISCHARGE NOTE OB - CARE PROVIDER_API CALL
Alin Colvin  Obstetrics and Gynecology  [address] [place] [id number]  Phone: [phone]  Fax: [phone]  Established Patient  Follow Up Time:

## 2024-12-11 NOTE — DISCHARGE NOTE OB - HOSPITAL COURSE
Patient admitted for induction of labor at 39+weeks. The patient had an uncomplicated vaginal delivery. Postpartum course was uncomplicated

## 2024-12-11 NOTE — OB RN DELIVERY SUMMARY - NSSELHIDDEN_OBGYN_ALL_OB_FT
[NS_DeliveryAttending1_OBGYN_ALL_OB_FT:[id number]==],[NS_DeliveryAssist1_OBGYN_ALL_OB_FT:[id number]],[NS_DeliveryRN_OBGYN_ALL_OB_FT:[id number]]

## 2024-12-11 NOTE — DISCHARGE NOTE OB - PATIENT PORTAL LINK FT
You can access the FollowMyHealth Patient Portal offered by [facility] by registering at the following website: [email or web address]. By joining [employer]’s FollowMyHealth portal, you will also be able to view your health information using other applications (apps) compatible with our system.

## 2024-12-11 NOTE — OB RN DELIVERY SUMMARY - NS_SEPSISRSKCALC_OBGYN_ALL_OB_FT
EOS calculated successfully. EOS Risk Factor: 0.5/1000 live births ([facility] national incidence); GA=39w3d; Temp=98.6; ROM=3.483; GBS='Positive'; Antibiotics='Broad spectrum antibiotics > 4 hrs prior to birth'

## 2024-12-11 NOTE — DISCHARGE NOTE OB - FINANCIAL ASSISTANCE
[facility] provides services at a reduced cost to those who are determined to be eligible through [facility]’s financial assistance program. Information regarding [facility]’s financial assistance program can be found by going to [email or web address] or by calling [phone].

## 2024-12-12 VITALS
SYSTOLIC BLOOD PRESSURE: 92 MMHG | TEMPERATURE: 98 F | HEART RATE: 62 BPM | DIASTOLIC BLOOD PRESSURE: 59 MMHG | RESPIRATION RATE: 18 BRPM | OXYGEN SATURATION: 96 %

## 2024-12-12 LAB
HCT VFR BLD CALC: 32.9 % — LOW (ref 34.5–45)
HGB BLD-MCNC: 10.8 G/DL — LOW (ref 11.5–15.5)

## 2024-12-12 PROCEDURE — 85014 HEMATOCRIT: CPT

## 2024-12-12 PROCEDURE — 85018 HEMOGLOBIN: CPT

## 2024-12-12 PROCEDURE — 85025 COMPLETE CBC W/AUTO DIFF WBC: CPT

## 2024-12-12 PROCEDURE — 59050 FETAL MONITOR W/REPORT: CPT

## 2024-12-12 PROCEDURE — 36415 COLL VENOUS BLD VENIPUNCTURE: CPT

## 2024-12-12 PROCEDURE — 86900 BLOOD TYPING SEROLOGIC ABO: CPT

## 2024-12-12 PROCEDURE — 86901 BLOOD TYPING SEROLOGIC RH(D): CPT

## 2024-12-12 PROCEDURE — 86850 RBC ANTIBODY SCREEN: CPT

## 2024-12-12 PROCEDURE — 86780 TREPONEMA PALLIDUM: CPT

## 2024-12-12 RX ADMIN — ACETAMINOPHEN 500MG 975 MILLIGRAM(S): 500 TABLET, COATED ORAL at 02:50

## 2024-12-12 RX ADMIN — ACETAMINOPHEN 500MG 975 MILLIGRAM(S): 500 TABLET, COATED ORAL at 09:02

## 2024-12-12 RX ADMIN — Medication 600 MILLIGRAM(S): at 06:00

## 2024-12-12 RX ADMIN — Medication 600 MILLIGRAM(S): at 12:08

## 2024-12-12 NOTE — OB PROVIDER DELIVERY SUMMARY - NSSELHIDDEN_OBGYN_ALL_OB_FT
[NS_DeliveryAttending1_OBGYN_ALL_OB_FT:[id number]],[NS_DeliveryAssist1_OBGYN_ALL_OB_FT:[id number]],[NS_DeliveryRN_OBGYN_ALL_OB_FT:[id number]]

## 2024-12-12 NOTE — OB PROVIDER DELIVERY SUMMARY - AS DELIV COMPLICATIONS OB
3 months of testosterone reordered  Due for labs and clinic visit, [employer] message sent  -Ema Arechiga MD      precipitous delivery

## 2024-12-12 NOTE — PROGRESS NOTE ADULT - SUBJECTIVE AND OBJECTIVE BOX
33 yo  at 39w3d admitted for IOL  suspected macrosomia  EFW 3900 g+  GBS +  3.5/60/+1    FHR  120 with moderate variability and +accels, no decels  Cat 1  [place]  q 2-3 minutes  
35 yo  at 39w3d    AROM clear 0635  4/60/0     with moderate variability and +accels, no decels  [place] q 3-5  Pitocin in use
MEGAN PEREZ is a 34y  now PPD#1 s/p spontaneous vaginal delivery at 39w3d gestation, uncomplicated.    S:    No acute events overnight.   The patient has no complaints.  Pain controlled with current treatment regimen.   She is ambulating without difficulty and tolerating PO.   - flatus/-BM/+ voiding   She endorses appropriate lochia, which is decreasing.   She is breastfeeding without difficulty.   She denies fevers, chills, nausea and vomiting.   She denies headache, chest pain and SOB.     O:    T(C): 36.8 (24 @ 04:00), Max: 36.8 (24 @ 11:07)  HR: 62 (24 @ 04:00) (60 - 88)  BP: 92/59 (24 @ 04:00) (85/51 - 115/68)  RR: 18 (24 @ 04:00) (18 - 20)  SpO2: 96% (24 @ 04:00) (95% - 97%)    Gen: NAD, AOx3  Pulm: Normal respiratory effort on RA  Abdomen:  Soft, non-tender, non-distended  Uterus:  Fundus firm below umbilicus  Ext:  Non-tender and non-edematous                          10.8   x     )-----------( x        ( 12 Dec 2024 05:58 )             32.9             
33 yo  at 39w2d admitted for IOL   GBS+ bacteruria   EFW 8 lb 10 oz  1.5/50/0 in office ~ 12 noon    FHR  120 with moderate variability and +accels, no decels  Cat 1  Laura  irregular contractions

## 2024-12-12 NOTE — OB PROVIDER DELIVERY SUMMARY - NSPROVIDERDELIVERYNOTE_OBGYN_ALL_OB_FT
Procedure: Normal Vaginal Delivery    Findings: Viable __male infant delivered in cephalic presentation at ___, placenta delivered at __  Apgar    Laceration:  QBL:    Procedure: Delivered JOSEPH, nuchal cord not noted, clear fluid. Patient feeling rectal pressure and upon exam, infants head delivering. Shoulders then delivered without difficulty followed by the rest of the body. Nose and mouth were bulb suctioned. Cord clamped and cut after delay. Baby handed to patient. Placenta delivered spontaneously, intact at 1006. Pitocin started. Excellent hemostasis achieved. Pt tolerated procedure well, in stable condition, recovering in LDR. Infant in LDR. Instrument/sponge count correct x 2 and confirmed by nurse.    Complications: none

## 2024-12-12 NOTE — PROGRESS NOTE ADULT - ASSESSMENT
IUP 39w3d with suspected macrosomia and GBS+  stable condition    AROM clear  Pitocin in use  Epidural in place and effective
A/P:  MEGAN PEREZ is a 34y  now PPD#1 s/p spontaneous vaginal delivery at 39w3d gestation, uncomplicated.    -Vital signs stable  -Hgb: 11.8 -> 10.8  -Voiding, tolerating PO, bowel function nml   -Advance care as tolerated   -Continue routine postpartum care and education  -Healthy female infant  -Dispo: Pt is stable, doing well and meeting all postpartum milestones. Possible discharge to home today pending attending approval.  
IUP 39 w 2 d with GBS+ bacteruria  suspected macrosomia    iv fluids  iv antibiotics  pitocin prn  epidural when requested  anticipate vaginal delivery
IUP 39w3d undergoing IOL  GBS+  suspected macrosomia    efm  epidural requested  pitocin prn  AROM after epidural  IV Antibiotics for GBS+

## 2024-12-14 PROBLEM — G43.909 MIGRAINE, UNSPECIFIED, NOT INTRACTABLE, WITHOUT STATUS MIGRAINOSUS: Chronic | Status: ACTIVE | Noted: 2024-12-10

## 2025-01-22 ENCOUNTER — APPOINTMENT (OUTPATIENT)
Dept: OBGYN | Facility: CLINIC | Age: 35
End: 2025-01-22

## 2025-01-22 VITALS
HEIGHT: 65 IN | SYSTOLIC BLOOD PRESSURE: 105 MMHG | WEIGHT: 126 LBS | DIASTOLIC BLOOD PRESSURE: 71 MMHG | BODY MASS INDEX: 20.99 KG/M2

## 2025-01-22 PROCEDURE — 99213 OFFICE O/P EST LOW 20 MIN: CPT | Mod: 25

## 2025-01-22 PROCEDURE — 99395 PREV VISIT EST AGE 18-39: CPT

## 2025-01-22 PROCEDURE — 99459 PELVIC EXAMINATION: CPT

## 2025-01-23 LAB — HPV HIGH+LOW RISK DNA PNL CVX: NOT DETECTED

## 2025-01-27 LAB — CYTOLOGY CVX/VAG DOC THIN PREP: NORMAL

## 2025-03-31 ENCOUNTER — OFFICE (OUTPATIENT)
Dept: URBAN - METROPOLITAN AREA CLINIC 12 | Facility: CLINIC | Age: 35
Setting detail: OPHTHALMOLOGY
End: 2025-03-31
Payer: COMMERCIAL

## 2025-03-31 DIAGNOSIS — H10.413: ICD-10-CM

## 2025-03-31 PROCEDURE — 99213 OFFICE O/P EST LOW 20 MIN: CPT | Performed by: STUDENT IN AN ORGANIZED HEALTH CARE EDUCATION/TRAINING PROGRAM

## 2025-03-31 ASSESSMENT — REFRACTION_MANIFEST
OS_SPHERE: -0.75
OD_VA1: 20/20-
OS_AXIS: 085
OD_CYLINDER: -0.50
OD_SPHERE: -1.00
OS_CYLINDER: -0.75
OS_VA1: 20/20-
OD_AXIS: 122

## 2025-03-31 ASSESSMENT — REFRACTION_CURRENTRX
OD_CYLINDER: SPH
OS_AXIS: 083
OS_VPRISM_DIRECTION: SV
OD_AXIS: 0
OS_CYLINDER: -0.50
OD_OVR_VA: 20/
OD_SPHERE: -1.50
OS_OVR_VA: 20/
OS_SPHERE: -1.25
OD_VPRISM_DIRECTION: SV

## 2025-03-31 ASSESSMENT — CONFRONTATIONAL VISUAL FIELD TEST (CVF)
OD_FINDINGS: FULL
OS_FINDINGS: FULL

## 2025-03-31 ASSESSMENT — VISUAL ACUITY
OS_BCVA: 20/15-
OD_BCVA: 20/15